# Patient Record
Sex: FEMALE | Race: WHITE | NOT HISPANIC OR LATINO | Employment: OTHER | URBAN - METROPOLITAN AREA
[De-identification: names, ages, dates, MRNs, and addresses within clinical notes are randomized per-mention and may not be internally consistent; named-entity substitution may affect disease eponyms.]

---

## 2023-03-24 LAB
ANION GAP IN SER/PLAS: 10 MMOL/L (ref 10–20)
CALCIUM (MG/DL) IN SER/PLAS: 9.2 MG/DL (ref 8.6–10.3)
CARBON DIOXIDE, TOTAL (MMOL/L) IN SER/PLAS: 26 MMOL/L (ref 21–32)
CHLORIDE (MMOL/L) IN SER/PLAS: 107 MMOL/L (ref 98–107)
CREATININE (MG/DL) IN SER/PLAS: 0.94 MG/DL (ref 0.5–1.05)
ERYTHROCYTE DISTRIBUTION WIDTH (RATIO) BY AUTOMATED COUNT: 13.6 % (ref 11.5–14.5)
ERYTHROCYTE MEAN CORPUSCULAR HEMOGLOBIN CONCENTRATION (G/DL) BY AUTOMATED: 33.2 G/DL (ref 32–36)
ERYTHROCYTE MEAN CORPUSCULAR VOLUME (FL) BY AUTOMATED COUNT: 88 FL (ref 80–100)
ERYTHROCYTES (10*6/UL) IN BLOOD BY AUTOMATED COUNT: 5.15 X10E12/L (ref 4–5.2)
GFR FEMALE: 66 ML/MIN/1.73M2
GLUCOSE (MG/DL) IN SER/PLAS: 99 MG/DL (ref 74–99)
HEMATOCRIT (%) IN BLOOD BY AUTOMATED COUNT: 45.2 % (ref 36–46)
HEMOGLOBIN (G/DL) IN BLOOD: 15 G/DL (ref 12–16)
LEUKOCYTES (10*3/UL) IN BLOOD BY AUTOMATED COUNT: 6.3 X10E9/L (ref 4.4–11.3)
PLATELETS (10*3/UL) IN BLOOD AUTOMATED COUNT: 277 X10E9/L (ref 150–450)
POTASSIUM (MMOL/L) IN SER/PLAS: 4.3 MMOL/L (ref 3.5–5.3)
SODIUM (MMOL/L) IN SER/PLAS: 139 MMOL/L (ref 136–145)
UREA NITROGEN (MG/DL) IN SER/PLAS: 18 MG/DL (ref 6–23)

## 2023-04-21 LAB
ANION GAP IN SER/PLAS: 11 MMOL/L (ref 10–20)
CALCIUM (MG/DL) IN SER/PLAS: 8.8 MG/DL (ref 8.6–10.3)
CARBON DIOXIDE, TOTAL (MMOL/L) IN SER/PLAS: 23 MMOL/L (ref 21–32)
CHLORIDE (MMOL/L) IN SER/PLAS: 110 MMOL/L (ref 98–107)
CREATININE (MG/DL) IN SER/PLAS: 0.75 MG/DL (ref 0.5–1.05)
ERYTHROCYTE DISTRIBUTION WIDTH (RATIO) BY AUTOMATED COUNT: 13.3 % (ref 11.5–14.5)
ERYTHROCYTE MEAN CORPUSCULAR HEMOGLOBIN CONCENTRATION (G/DL) BY AUTOMATED: 33.2 G/DL (ref 32–36)
ERYTHROCYTE MEAN CORPUSCULAR VOLUME (FL) BY AUTOMATED COUNT: 87 FL (ref 80–100)
ERYTHROCYTES (10*6/UL) IN BLOOD BY AUTOMATED COUNT: 4.71 X10E12/L (ref 4–5.2)
GFR FEMALE: 86 ML/MIN/1.73M2
GLUCOSE (MG/DL) IN SER/PLAS: 128 MG/DL (ref 74–99)
HEMATOCRIT (%) IN BLOOD BY AUTOMATED COUNT: 41 % (ref 36–46)
HEMOGLOBIN (G/DL) IN BLOOD: 13.6 G/DL (ref 12–16)
LEUKOCYTES (10*3/UL) IN BLOOD BY AUTOMATED COUNT: 6.1 X10E9/L (ref 4.4–11.3)
PLATELETS (10*3/UL) IN BLOOD AUTOMATED COUNT: 252 X10E9/L (ref 150–450)
POTASSIUM (MMOL/L) IN SER/PLAS: 3.7 MMOL/L (ref 3.5–5.3)
SODIUM (MMOL/L) IN SER/PLAS: 140 MMOL/L (ref 136–145)
UREA NITROGEN (MG/DL) IN SER/PLAS: 15 MG/DL (ref 6–23)

## 2023-04-22 LAB — SARS-COV-2 RESULT: NOT DETECTED

## 2023-04-25 ENCOUNTER — HOSPITAL ENCOUNTER (OUTPATIENT)
Dept: DATA CONVERSION | Facility: HOSPITAL | Age: 68
End: 2023-04-25
Attending: INTERNAL MEDICINE | Admitting: INTERNAL MEDICINE
Payer: COMMERCIAL

## 2023-04-25 DIAGNOSIS — R06.02 SHORTNESS OF BREATH: ICD-10-CM

## 2023-04-25 DIAGNOSIS — E78.5 HYPERLIPIDEMIA, UNSPECIFIED: ICD-10-CM

## 2023-04-25 DIAGNOSIS — I69.392 FACIAL WEAKNESS FOLLOWING CEREBRAL INFARCTION: ICD-10-CM

## 2023-04-25 DIAGNOSIS — R94.39 ABNORMAL RESULT OF OTHER CARDIOVASCULAR FUNCTION STUDY: ICD-10-CM

## 2023-04-25 DIAGNOSIS — Z91.040 LATEX ALLERGY STATUS: ICD-10-CM

## 2023-04-25 DIAGNOSIS — R07.9 CHEST PAIN, UNSPECIFIED: ICD-10-CM

## 2023-04-25 DIAGNOSIS — Z91.041 RADIOGRAPHIC DYE ALLERGY STATUS: ICD-10-CM

## 2023-04-25 DIAGNOSIS — Z87.891 PERSONAL HISTORY OF NICOTINE DEPENDENCE: ICD-10-CM

## 2023-04-25 DIAGNOSIS — M54.12 RADICULOPATHY, CERVICAL REGION: ICD-10-CM

## 2023-04-25 DIAGNOSIS — D69.6 THROMBOCYTOPENIA, UNSPECIFIED (CMS-HCC): ICD-10-CM

## 2023-04-25 DIAGNOSIS — E53.8 DEFICIENCY OF OTHER SPECIFIED B GROUP VITAMINS: ICD-10-CM

## 2023-04-25 DIAGNOSIS — D64.9 ANEMIA, UNSPECIFIED: ICD-10-CM

## 2023-04-25 DIAGNOSIS — I10 ESSENTIAL (PRIMARY) HYPERTENSION: ICD-10-CM

## 2023-04-25 DIAGNOSIS — Z90.49 ACQUIRED ABSENCE OF OTHER SPECIFIED PARTS OF DIGESTIVE TRACT: ICD-10-CM

## 2023-04-25 DIAGNOSIS — I20.89 OTHER FORMS OF ANGINA PECTORIS (CMS-HCC): ICD-10-CM

## 2023-04-25 DIAGNOSIS — Z82.49 FAMILY HISTORY OF ISCHEMIC HEART DISEASE AND OTHER DISEASES OF THE CIRCULATORY SYSTEM: ICD-10-CM

## 2023-04-25 DIAGNOSIS — E55.9 VITAMIN D DEFICIENCY, UNSPECIFIED: ICD-10-CM

## 2023-09-14 NOTE — H&P
History of Present Illness:   History Present Illness:  Reason for surgery: chest pain   HPI:    Verónica Silva is a 67 year old female that presents to the Cath Lab for a [facility] by Dr. Chung. She has a significant past medical history for hypertension, DL, CVA  (residual facial weakness ), anemia, abnormal gait, cervical radiculopathy, vertigo, thrombocytopenia, vitamin B12 and vitamin D deficiency.  Patient was last in cardiology office on March 24, 2023 and was evaluated by nurse practitioner Sharon Leone.   At that time the patient reported squeezing/pressure, pulsating pain in the middle of the chest-this occurs with activities such as cleaning and can last up to 1 hour.  She also describes using a stepladder would cause chest pain straightaway.  Her chest  pain is associated with dyspnea on exertion and palpitations.  This has been going on for a total of 1 year but it been worsening over the last last month.  Her chest pain is relieved with rest.  She ambulates with a cane.  Her TTE test on 3/7/2020 showed  LV systolic function is normal with an EF of 60%.  Normal pattern of LV diastolic filling.  No regional wall motion abnormalities. Her stress test showed no clinical electrocardiographic evidence of ischemia at maximal infusion. She has small sized partially  reversible perfusion defect in the distal anterior and apical walls suspicious for prior infarct and minimal gabby-infarct ischemia.  Calculated EF of 63% without segmental wall motion abnormalities seen.  She had a CT calcium score in 2019 that was 0.     Past Medical History: hypertension, DL, CVA (residual facial weakness ), anemia, abnormal gait, cervical radiculopathy, vertigo, thrombocytopenia, vitamin B12 and vitamin D deficiency.    Past Surgical History: Cholecystectomy, dental surgery, excision of facial bone, excision of turbinate, ileostomy,  myringotomy with ventilating tube insertion, no surgery, sex reassignment surgery 2008, history  of tonsillectomy with adenoidectomy    Family history: Mother with bleeding disorder, cardiac disorder, diabetes, cancer, mental disorder, heart problem.  Father with history of asthma, cardiac disorder, cancer.  Brother with autism, schizophrenia, drug disorder.    Social history: Denies alcohol consumption, former smoker, no alcohol use no caffeine use, no drug use, retired    Allergies:        Allergies:  ·  contrast (specific type unknown) : Anaphylaxis  ·  Morphine  Sulfate: Anaphylaxis  ·  Latex : Anaphylaxis  ·  Seafood : Other  ·  iodine : Unknown  ·  ibuprofen : Unknown       Intolerances:  ·  Animal/Fur Dander : Congestion    Home Medication Review:   Home Medications Reviewed: yes     Impression/Procedure:   ·  Impression and Planned Procedure: Impression: Chest pain abnormal stress test     Plan: Left heart catheterization with angioplasty/stenting if needed       ERAS (Enhanced Recovery After Surgery):  ·  ERAS Patient: no     Review of Systems:   Review of Systems:  Constitutional: NEGATIVE: Fever, Chills, Anorexia,  Weight Loss, Malaise     Eyes: NEGATIVE: Blurry Vision, Drainage, Diploplia,  Redness, Vision Loss/ Change     ENMT: NEGATIVE: Nasal Discharge, Nasal Congestion,  Ear Pain, Mouth Pain, Throat Pain     Respiratory: POSITIVE: Shortness of Breath; NEGATIVE:  Dry Cough, Productive Cough, Hemoptysis, Wheezing     Cardiac: POSITIVE: Chest Pain, Dyspnea on Exertion ; NEGATIVE: Orthopnea, Palpitations, Syncope     Gastrointestinal: NEGATIVE: Nausea, Vomiting, Diarrhea,  Constipation, Abdominal Pain     Genitourinary: NEGATIVE: Discharge, Dysuria, Flank  Pain, Frequency, Hematuria     Musculoskeletal: NEGATIVE: Decreased ROM, Pain,  Swelling, Stiffness, Weakness     Neurological: NEGATIVE: Dizziness, Confusion, Headache,  Seizures, Syncope     Psychiatric: NEGATIVE: Mood Changes, Anxiety, Hallucinations,  Sleep Changes, Suicidal Ideas     Skin: NEGATIVE: Mass, Pain, Pruritus, Rash, Ulcer          Physical Exam by System:    Constitutional: Well developed, awake/alert/oriented  x3, no distress, alert and cooperative   Eyes: EOMI, clear sclera   ENMT: mucous membranes moist, no apparent injury,  no lesions seen   Head/Neck: No JVD, trachea midline   Respiratory/Thorax: Patent airways, CTAB, normal  breath sounds with good chest expansion   Cardiovascular: Regular, rate and rhythm, no murmurs,  2+ equal pulses of the extremities, normal S 1and S 2   Gastrointestinal: Nondistended, soft, non-tender   Musculoskeletal: ROM intact, no joint swelling, normal  strength   Extremities: normal extremities, no cyanosis, no  edema   Neurological: alert and oriented x3, normal strength   Psychological: Appropriate mood and behavior   Skin: Warm and dry, no lesions, no rashes     Airway/Sedation Assessment:  ·  Oropharyngeal Classification Class II   ·  ASA PS Classification ASA III   ·  Sedation Plan moderate sedation     Consent:   COVID-19 Consent:  ·  COVID-19 Risk Consent Surgeon has reviewed key risks related to the risk of elsie COVID-19 and if they contract COVID-19 what the risks are.       Electronic Signatures:  Leeann Grimm (APRN-CNP)  (Signed 25-Apr-2023 12:06)   Authored: History of Present Illness, Allergies, Home  Medication Review, Impression/Procedure, ERAS, Review of Systems, Physical Exam, Consent, Note Completion      Last Updated: 25-Apr-2023 12:06 by Leeann Grimm (APRN-CNP)

## 2023-10-06 DIAGNOSIS — H90.3 SENSORINEURAL HEARING LOSS, BILATERAL: ICD-10-CM

## 2023-10-09 RX ORDER — FLUTICASONE PROPIONATE 50 MCG
2 SPRAY, SUSPENSION (ML) NASAL DAILY
Qty: 16 G | Refills: 6 | Status: SHIPPED | OUTPATIENT
Start: 2023-10-09 | End: 2024-05-07

## 2023-10-09 NOTE — TELEPHONE ENCOUNTER
Requested Prescriptions     Pending Prescriptions Disp Refills    fluticasone (Flonase) 50 mcg/actuation nasal spray [Pharmacy Med Name: fluticasone propionate 50 mcg/actuation nasal spray,suspension] 16 g 6     Sig: SHAKE LIQUID AND USE 2 SPRAYS IN EACH NOSTRIL EVERY DAY      Xenia Plasencia MA

## 2023-11-20 ENCOUNTER — TELEPHONE (OUTPATIENT)
Dept: PRIMARY CARE | Facility: CLINIC | Age: 68
End: 2023-11-20

## 2023-11-20 ENCOUNTER — OFFICE VISIT (OUTPATIENT)
Dept: PRIMARY CARE | Facility: CLINIC | Age: 68
End: 2023-11-20
Payer: COMMERCIAL

## 2023-11-20 VITALS
WEIGHT: 210 LBS | TEMPERATURE: 98.1 F | HEIGHT: 65 IN | OXYGEN SATURATION: 98 % | SYSTOLIC BLOOD PRESSURE: 126 MMHG | HEART RATE: 68 BPM | BODY MASS INDEX: 34.99 KG/M2 | DIASTOLIC BLOOD PRESSURE: 72 MMHG

## 2023-11-20 DIAGNOSIS — I10 ESSENTIAL HYPERTENSION, BENIGN: Primary | ICD-10-CM

## 2023-11-20 DIAGNOSIS — M51.37 DEGENERATION OF LUMBAR OR LUMBOSACRAL INTERVERTEBRAL DISC: ICD-10-CM

## 2023-11-20 PROCEDURE — 1159F MED LIST DOCD IN RCRD: CPT | Performed by: INTERNAL MEDICINE

## 2023-11-20 PROCEDURE — 3074F SYST BP LT 130 MM HG: CPT | Performed by: INTERNAL MEDICINE

## 2023-11-20 PROCEDURE — 3078F DIAST BP <80 MM HG: CPT | Performed by: INTERNAL MEDICINE

## 2023-11-20 PROCEDURE — 99215 OFFICE O/P EST HI 40 MIN: CPT | Performed by: INTERNAL MEDICINE

## 2023-11-20 RX ORDER — ATORVASTATIN CALCIUM 80 MG/1
80 TABLET, FILM COATED ORAL NIGHTLY
COMMUNITY
End: 2023-12-12

## 2023-11-20 RX ORDER — AMLODIPINE BESYLATE 5 MG/1
5 TABLET ORAL
COMMUNITY
Start: 2019-06-27 | End: 2023-12-12

## 2023-11-20 RX ORDER — ALBUTEROL SULFATE 90 UG/1
AEROSOL, METERED RESPIRATORY (INHALATION)
COMMUNITY
Start: 2015-06-30

## 2023-11-20 RX ORDER — ATORVASTATIN CALCIUM 40 MG/1
40 TABLET, FILM COATED ORAL
COMMUNITY
Start: 2021-03-24 | End: 2024-03-19 | Stop reason: SDUPTHER

## 2023-11-20 RX ORDER — ASPIRIN 325 MG
50000 TABLET, DELAYED RELEASE (ENTERIC COATED) ORAL
COMMUNITY
Start: 2023-10-07 | End: 2024-03-11 | Stop reason: WASHOUT

## 2023-11-20 RX ORDER — ASPIRIN 81 MG/1
TABLET ORAL
COMMUNITY
Start: 2021-12-20 | End: 2024-03-11 | Stop reason: WASHOUT

## 2023-11-20 RX ORDER — CYCLOBENZAPRINE HCL 10 MG
TABLET ORAL
COMMUNITY

## 2023-11-20 RX ORDER — AZELASTINE HYDROCHLORIDE, FLUTICASONE PROPIONATE 137; 50 UG/1; UG/1
SPRAY, METERED NASAL
COMMUNITY
Start: 2021-05-12 | End: 2024-03-11 | Stop reason: WASHOUT

## 2023-11-20 RX ORDER — BUDESONIDE AND FORMOTEROL FUMARATE DIHYDRATE 160; 4.5 UG/1; UG/1
2 AEROSOL RESPIRATORY (INHALATION)
COMMUNITY
Start: 2018-03-12

## 2023-11-20 RX ORDER — BUPRENORPHINE HCL 600 MCG
FILM, MEDICATED (EA) BUCCAL
COMMUNITY
Start: 2019-04-19 | End: 2024-03-11 | Stop reason: WASHOUT

## 2023-11-20 RX ORDER — AZELASTINE 1 MG/ML
1 SPRAY, METERED NASAL 2 TIMES DAILY
COMMUNITY
Start: 2022-08-23

## 2023-11-20 ASSESSMENT — ENCOUNTER SYMPTOMS
WEAKNESS: 1
CONSTIPATION: 0
DIZZINESS: 0
ACTIVITY CHANGE: 0
DYSURIA: 0
ABDOMINAL PAIN: 0
ENDOCRINE NEGATIVE: 1
SHORTNESS OF BREATH: 0
EYE REDNESS: 0
COUGH: 0
BACK PAIN: 1
FATIGUE: 0
BLOOD IN STOOL: 0
HEADACHES: 0
ARTHRALGIAS: 1
WHEEZING: 0
PALPITATIONS: 0
ALLERGIC/IMMUNOLOGIC NEGATIVE: 1
FREQUENCY: 0
ADENOPATHY: 0
JOINT SWELLING: 0
FEVER: 0
NUMBNESS: 0
AGITATION: 0

## 2023-11-20 NOTE — PROGRESS NOTES
Subjective   Patient ID: Verónica Silva is a 67 y.o. female who presents for No chief complaint on file..    HPI: She lives  in [address] by self and her brother lives one floor below.  She has chronic pain , multiple medical issues and has ileostomy.   She uses cane , and able to do ADLs. No recent fall , injury  She has bilateral LE tingling , numbness and feeling of burning over feet soles, more so at night.  MRI of L S spine reviewed with her , she has multilevel spondylosis and varying degree  of central canal stenosis / neural foraminal narrowing.  Surgery was postponed for L S Spine, per Dr Izaguirre and she had heart cath per dr Chung.  Leg swelling has resolved now. She had lumbar spine surgery in October and post op rehab in a NH  She has returned to home , has [facility] health care nurse visiting for OT, PT.  She goes to Pain management and gets Belbuca film.She has been taking oxycodone po for pain.   The pain has significantly improved and her mobility has improved also, she can stand and walk for short distance.    She has seen Dr Ronald Camara, and had UGI , SBFT study, advised to have low fiber diet.          Review of Systems   Constitutional:  Negative for activity change, fatigue and fever.        Up with a walker , assistance   HENT:  Negative for congestion.    Eyes:  Negative for redness and visual disturbance.   Respiratory:  Negative for cough, shortness of breath and wheezing.    Cardiovascular:  Negative for chest pain, palpitations and leg swelling.   Gastrointestinal:  Negative for abdominal pain, blood in stool and constipation.   Endocrine: Negative.    Genitourinary:  Negative for dysuria, frequency and urgency.        +OAB   Musculoskeletal:  Positive for arthralgias, back pain and gait problem. Negative for joint swelling.   Skin:  Negative for rash.   Allergic/Immunologic: Negative.    Neurological:  Positive for weakness. Negative for dizziness, numbness and headaches.   Hematological:   "Negative for adenopathy.   Psychiatric/Behavioral:  Negative for agitation and behavioral problems.        Objective   /72 (BP Location: Left arm, Patient Position: Sitting, BP Cuff Size: Adult)   Pulse 68   Temp 36.7 °C (98.1 °F)   Ht 1.651 m (5' 5\")   Wt 95.3 kg (210 lb)   SpO2 98%   BMI 34.95 kg/m²     Physical Exam  Constitutional:       Appearance: Normal appearance.   HENT:      Nose: No congestion.      Mouth/Throat:      Mouth: Mucous membranes are moist.      Pharynx: Oropharynx is clear.   Eyes:      Conjunctiva/sclera: Conjunctivae normal.   Cardiovascular:      Rate and Rhythm: Normal rate and regular rhythm.      Pulses: Normal pulses.      Heart sounds: Normal heart sounds. No murmur heard.  Pulmonary:      Effort: Pulmonary effort is normal.      Breath sounds: Normal breath sounds. No wheezing or rales.   Abdominal:      General: Abdomen is flat. Bowel sounds are normal.      Palpations: Abdomen is soft.      Hernia: No hernia is present.      Comments: Ileostomy site is ok   Musculoskeletal:         General: No swelling or tenderness. Normal range of motion.      Cervical back: Normal range of motion and neck supple.      Right lower leg: No edema.      Left lower leg: No edema.   Skin:     General: Skin is warm and dry.      Capillary Refill: Capillary refill takes less than 2 seconds.      Findings: No rash.      Comments: Lumbar surgical scar well healed   Neurological:      General: No focal deficit present.      Mental Status: She is alert and oriented to person, place, and time.   Psychiatric:         Mood and Affect: Mood normal.         Behavior: Behavior normal.         Assessment/Plan       Lumbar spine DJD:  S/p surgery per Dr Izaguirre   Postop rehab finished at NH  Continue OT and PT , pain control  She goes to pain management   Up with assist, walker    OAB:  She has appt with Dr Acevedo at [place]      Hypertension:  Amlodipine  Carvediol   BILL diet  She follows with Dr" Mayug    Hyperlipidemia:  Statin    H/o TIA :  ASA and statin    Ileostomy status:  GI / surgery followup  No leak    She saw VARSHA Cole  at [facility] , had motility studies ( [facility] )  EGD and Flex sig were done   She had UGI and SBFT also per Dr Camara  Low residue diet  Bentyl prn for cramps , and zofran prn for nausea             Vision care  She would wait to get flushot, Covid booster  Mammogram yearly

## 2023-11-20 NOTE — TELEPHONE ENCOUNTER
NICKY [phone] OCCUPATIONAL THERAPIST WITH [employer]  IS CALLING TO GET VERBAL ORDERS FOR PATIENT TO GET  OT ONE TIME A WEEK FOR TWO WEEKS AND TWO TIMES A WEEK  FOR THREE WEEKS.  I GAVE THE VERBAL ORDER

## 2023-11-29 ENCOUNTER — OFFICE VISIT (OUTPATIENT)
Dept: UROLOGY | Facility: CLINIC | Age: 68
End: 2023-11-29
Payer: COMMERCIAL

## 2023-11-29 ENCOUNTER — TELEPHONE (OUTPATIENT)
Dept: PRIMARY CARE | Facility: CLINIC | Age: 68
End: 2023-11-29

## 2023-11-29 LAB
POC BILIRUBIN, URINE: ABNORMAL
POC BLOOD, URINE: NEGATIVE
POC GLUCOSE, URINE: NEGATIVE MG/DL
POC KETONES, URINE: ABNORMAL MG/DL
POC LEUKOCYTES, URINE: NEGATIVE
POC NITRITE,URINE: NEGATIVE
POC PH, URINE: 6 PH
POC PROTEIN, URINE: ABNORMAL MG/DL
POC SPECIFIC GRAVITY, URINE: 1.02
POC UROBILINOGEN, URINE: 0.2 EU/DL

## 2023-11-29 PROCEDURE — 99214 OFFICE O/P EST MOD 30 MIN: CPT | Performed by: STUDENT IN AN ORGANIZED HEALTH CARE EDUCATION/TRAINING PROGRAM

## 2023-11-29 PROCEDURE — 81003 URINALYSIS AUTO W/O SCOPE: CPT | Performed by: STUDENT IN AN ORGANIZED HEALTH CARE EDUCATION/TRAINING PROGRAM

## 2023-11-29 PROCEDURE — 1159F MED LIST DOCD IN RCRD: CPT | Performed by: STUDENT IN AN ORGANIZED HEALTH CARE EDUCATION/TRAINING PROGRAM

## 2023-11-29 RX ORDER — OXYCODONE HYDROCHLORIDE 10 MG/1
TABLET ORAL EVERY 6 HOURS
COMMUNITY
End: 2024-03-11 | Stop reason: WASHOUT

## 2023-11-29 RX ORDER — TAMSULOSIN HYDROCHLORIDE 0.4 MG/1
0.4 CAPSULE ORAL DAILY
Qty: 30 CAPSULE | Refills: 2 | Status: SHIPPED | OUTPATIENT
Start: 2023-11-29 | End: 2024-02-02

## 2023-11-29 NOTE — PROGRESS NOTES
[facility] Urology - Dr. Nico Navas    Established Patient  Visit    PCP: Jazzmine Catalan MD    Chief Complaint/Reason for visit: FU LUTS    HPI:   LUTS  Hx of ?urethral stricture  C/o weak stream  Was on tamsulosin previously   Had post operative urinary retention after L-spine surgery last month  Some blood in urine at that time   Normal amount of gas and stool  Still using oxycodone     Last visit:    1. BPH with LUTS  Mild  Weak stream  H/o stricture, last cysto >5 years ago  Overall minimal bother  Nocturia q3 hours, stable  PSA historically very low     Past Medical History:   Diagnosis Date    Acute pharyngitis, unspecified 01/20/2017    Sore throat    Acute pharyngitis, unspecified 08/04/2020    Sore throat    Acute respiratory distress     Respiratory difficulty    Acute sinusitis, unspecified 04/11/2017    Acute sinusitis with symptoms > 10 days    Anosmia 08/04/2020    Loss of smell    Candidiasis of skin and nail 07/31/2018    Moniliasis, cutaneous    Cellulitis, unspecified 01/21/2021    Cellulitis    Colostomy status (CMS/[facility]) 01/17/2018    Colostomy in place    Cough, unspecified 07/22/2020    Cough    Dysuria 07/21/2022    Dysuria    Encounter for immunization 12/09/2019    Flu vaccine need    Endocrine disorder, unspecified 04/22/2019    Hormone imbalance    Gastro-esophageal reflux disease with esophagitis, without bleeding     Gastroesophageal reflux disease with esophagitis    Headache, unspecified 08/04/2020    Headache    Hyperlipidemia, unspecified 04/29/2020    Hyperlipidemia    Localized swelling, mass and lump, right upper limb 11/20/2017    Localized swelling on right hand    Lower abdominal pain, unspecified 01/17/2018    Lower abdominal pain    Nausea 08/04/2020    Nausea in adult    Nausea with vomiting, unspecified 07/22/2020    Nausea and/or vomiting    Other allergy status, other than to drugs and biological substances     Environmental allergies    Other conditions influencing  health status 11/20/2017    History of cough    Other conditions influencing health status 10/04/2017    History of cough    Other difficulties with micturition 06/26/2017    Voiding difficulty    Other disorders of nervous system     Neurological disorder    Other fatigue 04/29/2020    Fatigue, unspecified type    Other fecal abnormalities 01/17/2018    Dark stools    Other specified soft tissue disorders 11/20/2017    Foot swelling    Other specified soft tissue disorders 01/21/2021    Left leg swelling    Pain in right knee 04/04/2016    Acute pain of right knee    Paresthesia of skin 02/24/2016    Pins and needles sensation    Paresthesia of skin 06/25/2018    Paresthesia of skin    Personal history of other (healed) physical injury and trauma 01/20/2017    History of sprain of ankle    Personal history of other diseases of the circulatory system     History of hypertension    Personal history of other diseases of the musculoskeletal system and connective tissue     History of low back pain    Personal history of other diseases of the respiratory system 01/19/2016    History of acute bronchitis    Personal history of other diseases of the respiratory system 10/04/2017    History of sore throat    Personal history of other diseases of the respiratory system 11/20/2017    History of acute sinusitis    Personal history of other specified conditions 05/03/2016    History of vertigo    Personal history of other specified conditions 08/04/2020    History of fatigue    Poor urinary stream 05/07/2019    Poor urinary stream    Rash and other nonspecific skin eruption 09/21/2017    Rash    Shortness of breath 02/24/2016    SOB (shortness of breath)    Thrombocytopenia, unspecified (CMS/HCC) 01/18/2018    Thrombocytopenia    Transient cerebral ischemic attack, unspecified 04/22/2019    TIA (transient ischemic attack)    Unspecified abnormalities of gait and mobility 04/22/2019    Abnormal gait    Unspecified symptoms and  signs involving the genitourinary system 03/19/2020    Urinary symptom or sign    Vitamin B12 deficiency anemia due to intrinsic factor deficiency 09/21/2017    Anemia, pernicious    Vitamin D deficiency, unspecified 01/11/2021    Vitamin D deficiency    Vomiting, unspecified 01/17/2018    Vomiting    Zoster without complications 10/16/2015    Shingles     Past Surgical History:   Procedure Laterality Date    CHOLECYSTECTOMY  03/18/2014    Cholecystectomy    ILEOSTOMY  03/18/2014    Ileostomy    MYRINGOTOMY W/ TUBES  03/18/2014    Myringotomy - With Ventilating Tube Insertion    OTHER SURGICAL HISTORY  06/28/2019    Nose surgery    OTHER SURGICAL HISTORY  06/28/2019    Ear pressure equalization tube insertion    OTHER SURGICAL HISTORY  03/18/2014    Sex Reassignment Surgery    OTHER SURGICAL HISTORY  03/18/2014    Excision Of Turbinate    OTHER SURGICAL HISTORY  03/18/2014    Dental Surgery    OTHER SURGICAL HISTORY  03/18/2014    Excision Of Facial Bone    TONSILLECTOMY  03/18/2014    Tonsillectomy With Adenoidectomy     Social History     Socioeconomic History    Marital status: Single     Spouse name: Not on file    Number of children: Not on file    Years of education: Not on file    Highest education level: Not on file   Occupational History    Not on file   Tobacco Use    Smoking status: Not on file    Smokeless tobacco: Not on file   Substance and Sexual Activity    Alcohol use: Not on file    Drug use: Not on file    Sexual activity: Not on file   Other Topics Concern    Not on file   Social History Narrative    Not on file     Social Determinants of Health     Financial Resource Strain: Not on file   Food Insecurity: Not on file   Transportation Needs: Not on file   Physical Activity: Not on file   Stress: Not on file   Social Connections: Not on file   Intimate Partner Violence: Not on file   Housing Stability: Not on file     Current Outpatient Medications   Medication Instructions    albuterol 90  mcg/actuation inhaler INHALE TWO PUFFS BY MOUTH EVERY 4 HOURS AS NEEDED FOR WHEEZING, SHORTNESS OF BREATH, OR cough (USE WITH spacer)    amLODIPine (NORVASC) 5 mg, oral    aspirin 81 mg EC tablet oral    atorvastatin (LIPITOR) 80 mg, oral, Nightly    azelastine (Astelin) 137 mcg (0.1 %) nasal spray 1 spray, nasal, 2 times daily    azelastine-fluticasone (Dymista) 137-50 mcg/spray nasal spray nasal    budesonide-formoteroL (Symbicort) 160-4.5 mcg/actuation inhaler 2 puffs, inhalation    buprenorphine (Belbuca) 600 mcg buccal film APPLY ONE FILM TO THE GUM EVERY TWELVE HOURS 4/19/19    cholecalciferol (VITAMIN D-3) 50,000 Units, oral, Weekly    cyclobenzaprine (Flexeril) 10 mg tablet TAKE 1/2 TO 1 TABLET BY MOUTH TWICE DAILY AS NEEDED FOR 30 DAYS    fluticasone (Flonase) 50 mcg/actuation nasal spray 2 sprays, Daily, Shake liquid prior to use.    Lipitor 40 mg, oral     No Known Allergies       Physical Exam:  General: Alert, cooperative, no acute distress  Eyes: Sclera clear  Cardiac: Extremities are warm and well perfused  Lungs: Breathing non-labored. Speaking in clear and complete sentences.  MSK: Ambulatory with walker   Neuro: Alert and oriented to person, place, and time  Psych: Normal mood and affect  Skin: No obvious lesions or rashes    Assessment and Plan:    1. Benign prostatic hyperplasia with urinary retention  Urinalysis today in the office demonstrates no overt evidence of urinary tract infection.  Resume tamsulosin  R/B/A discussed  PVR low  FUV 3 months PVR  - POCT UA Automated manually resulted

## 2023-11-29 NOTE — TELEPHONE ENCOUNTER
MARLA NURSE CALLING FROM [facility] ASKING FOR VERBAL ORDER FOR RECTAL IRRIGATION, PT JUST HAD BACK SURGERY AND HAVING HARD TIME.   [phone] TO CALL BACK WITH VERBAL

## 2023-11-29 NOTE — TELEPHONE ENCOUNTER
I think the [facility] nurse should check with surgeon for post operative issues, I have not seen her after recent surgery

## 2023-12-12 DIAGNOSIS — I10 HYPERTENSION, UNSPECIFIED TYPE: Primary | ICD-10-CM

## 2023-12-12 DIAGNOSIS — E78.2 MIXED HYPERLIPIDEMIA: ICD-10-CM

## 2023-12-12 RX ORDER — ATORVASTATIN CALCIUM 80 MG/1
80 TABLET, FILM COATED ORAL NIGHTLY
Qty: 90 TABLET | Refills: 0 | Status: SHIPPED | OUTPATIENT
Start: 2023-12-12 | End: 2024-03-12

## 2023-12-12 RX ORDER — AMLODIPINE BESYLATE 5 MG/1
5 TABLET ORAL DAILY
Qty: 90 TABLET | Refills: 0 | Status: SHIPPED | OUTPATIENT
Start: 2023-12-12 | End: 2024-03-12

## 2024-01-05 ENCOUNTER — TELEPHONE (OUTPATIENT)
Dept: PRIMARY CARE | Facility: CLINIC | Age: 69
End: 2024-01-05
Payer: COMMERCIAL

## 2024-01-05 NOTE — TELEPHONE ENCOUNTER
Esther from [facility] called. Asking for a verbal order to continue physical therapy- given.      Also,  Pt had covid 2-3 weeks ago. So extending physical therapy for that reason.

## 2024-01-30 PROBLEM — R39.9 URINARY SYMPTOM OR SIGN: Status: ACTIVE | Noted: 2024-01-30

## 2024-01-30 PROBLEM — R49.0 HOARSENESS OF VOICE: Status: ACTIVE | Noted: 2024-01-30

## 2024-01-30 PROBLEM — I34.1 NONRHEUMATIC MITRAL (VALVE) PROLAPSE: Status: ACTIVE | Noted: 2023-11-01

## 2024-01-30 PROBLEM — R53.81 MALAISE AND FATIGUE: Status: ACTIVE | Noted: 2024-01-30

## 2024-01-30 PROBLEM — G25.81 RESTLESS LEGS SYNDROME: Status: ACTIVE | Noted: 2023-11-01

## 2024-01-30 PROBLEM — M48.07 LUMBOSACRAL SPINAL STENOSIS: Status: ACTIVE | Noted: 2023-11-01

## 2024-01-30 PROBLEM — E34.9 HORMONE IMBALANCE: Status: ACTIVE | Noted: 2024-01-30

## 2024-01-30 PROBLEM — H10.30 ACUTE CONJUNCTIVITIS: Status: ACTIVE | Noted: 2024-01-30

## 2024-01-30 PROBLEM — N40.0 BENIGN PROSTATIC HYPERPLASIA WITHOUT LOWER URINARY TRACT SYMPTOMS: Status: ACTIVE | Noted: 2023-11-01

## 2024-01-30 PROBLEM — R26.9 ABNORMAL GAIT: Status: ACTIVE | Noted: 2024-01-30

## 2024-01-30 PROBLEM — R06.2 WHEEZING: Status: ACTIVE | Noted: 2024-01-30

## 2024-01-30 PROBLEM — R51.9 HEADACHE: Status: ACTIVE | Noted: 2024-01-30

## 2024-01-30 PROBLEM — B37.2 MONILIASIS, CUTANEOUS: Status: ACTIVE | Noted: 2024-01-30

## 2024-01-30 PROBLEM — R39.12 POOR URINARY STREAM: Status: ACTIVE | Noted: 2024-01-30

## 2024-01-30 PROBLEM — B02.9 SHINGLES: Status: ACTIVE | Noted: 2024-01-30

## 2024-01-30 PROBLEM — R06.02 SOB (SHORTNESS OF BREATH): Status: ACTIVE | Noted: 2024-01-30

## 2024-01-30 PROBLEM — J32.4 CHRONIC PANSINUSITIS: Status: ACTIVE | Noted: 2024-01-30

## 2024-01-30 PROBLEM — R39.198 VOIDING DIFFICULTY: Status: ACTIVE | Noted: 2024-01-30

## 2024-01-30 PROBLEM — M79.89 LEFT LEG SWELLING: Status: ACTIVE | Noted: 2024-01-30

## 2024-01-30 PROBLEM — R53.83 FATIGUE: Status: ACTIVE | Noted: 2024-01-30

## 2024-01-30 PROBLEM — R20.2 PINS AND NEEDLES SENSATION: Status: ACTIVE | Noted: 2024-01-30

## 2024-01-30 PROBLEM — J30.1 ALLERGIC RHINITIS DUE TO POLLEN: Status: ACTIVE | Noted: 2024-01-30

## 2024-01-30 PROBLEM — Z90.49 STATUS POST COLECTOMY: Status: ACTIVE | Noted: 2022-01-11

## 2024-01-30 PROBLEM — R35.0 URINARY FREQUENCY: Status: ACTIVE | Noted: 2024-01-30

## 2024-01-30 PROBLEM — M54.50 LOW BACK PAIN: Status: ACTIVE | Noted: 2024-01-30

## 2024-01-30 PROBLEM — D47.2 IGG LAMBDA MONOCLONAL GAMMOPATHY: Status: ACTIVE | Noted: 2024-01-30

## 2024-01-30 PROBLEM — G62.9 PERIPHERAL NEUROPATHY: Status: ACTIVE | Noted: 2024-01-30

## 2024-01-30 PROBLEM — B37.0 THRUSH: Status: ACTIVE | Noted: 2024-01-30

## 2024-01-30 PROBLEM — J34.0 CELLULITIS OF NASAL TIP: Status: ACTIVE | Noted: 2024-01-30

## 2024-01-30 PROBLEM — R53.83 MALAISE AND FATIGUE: Status: ACTIVE | Noted: 2024-01-30

## 2024-01-30 PROBLEM — B36.9 SUPERFICIAL FUNGUS INFECTION OF SKIN: Status: ACTIVE | Noted: 2024-01-30

## 2024-01-30 PROBLEM — H90.3 SENSORINEURAL HEARING LOSS (SNHL) OF BOTH EARS: Status: ACTIVE | Noted: 2024-01-30

## 2024-01-30 PROBLEM — R30.0 DYSURIA: Status: ACTIVE | Noted: 2024-01-30

## 2024-01-30 PROBLEM — L29.9 PRURITUS: Status: ACTIVE | Noted: 2024-01-30

## 2024-01-30 PROBLEM — L03.116 CELLULITIS OF LEFT LOWER EXTREMITY: Status: ACTIVE | Noted: 2020-11-28

## 2024-01-30 PROBLEM — K31.7 GASTRIC POLYP: Status: ACTIVE | Noted: 2022-01-11

## 2024-01-30 PROBLEM — R19.5 DARK STOOLS: Status: ACTIVE | Noted: 2024-01-30

## 2024-01-30 PROBLEM — R31.9 HEMATURIA: Status: ACTIVE | Noted: 2024-01-30

## 2024-01-30 PROBLEM — N39.0 UTI (URINARY TRACT INFECTION): Status: ACTIVE | Noted: 2024-01-30

## 2024-01-30 PROBLEM — R04.0 EPISTAXIS, RECURRENT: Status: ACTIVE | Noted: 2024-01-30

## 2024-01-30 PROBLEM — H81.13 BENIGN PAROXYSMAL POSITIONAL VERTIGO DUE TO BILATERAL VESTIBULAR DISORDER: Status: ACTIVE | Noted: 2024-01-30

## 2024-01-30 PROBLEM — M47.817 LUMBOSACRAL SPONDYLOSIS: Status: ACTIVE | Noted: 2024-01-30

## 2024-01-30 PROBLEM — R07.89 ATYPICAL CHEST PAIN: Status: ACTIVE | Noted: 2024-01-30

## 2024-01-30 PROBLEM — H61.23 EXCESSIVE EAR WAX, BILATERAL: Status: ACTIVE | Noted: 2024-01-30

## 2024-01-30 PROBLEM — R22.31 LOCALIZED SWELLING ON RIGHT HAND: Status: ACTIVE | Noted: 2024-01-30

## 2024-01-30 PROBLEM — K58.8 OTHER IRRITABLE BOWEL SYNDROME: Status: ACTIVE | Noted: 2023-11-02

## 2024-01-30 PROBLEM — R13.10 DYSPHAGIA: Status: ACTIVE | Noted: 2022-01-11

## 2024-01-30 PROBLEM — R05.9 COUGH: Status: ACTIVE | Noted: 2024-01-30

## 2024-01-30 PROBLEM — M54.2 NECK PAIN: Status: ACTIVE | Noted: 2024-01-30

## 2024-01-30 PROBLEM — D69.6 THROMBOCYTOPENIA (CMS-HCC): Status: ACTIVE | Noted: 2024-01-30

## 2024-01-30 PROBLEM — J34.89 NASAL CRUSTING: Status: ACTIVE | Noted: 2024-01-30

## 2024-01-30 PROBLEM — H93.12 LEFT-SIDED TINNITUS: Status: ACTIVE | Noted: 2024-01-30

## 2024-01-30 PROBLEM — H90.A22 SENSORINEURAL HEARING LOSS (SNHL) OF LEFT EAR WITH RESTRICTED HEARING OF RIGHT EAR: Status: ACTIVE | Noted: 2024-01-30

## 2024-01-30 PROBLEM — J02.9 SORE THROAT: Status: ACTIVE | Noted: 2024-01-30

## 2024-01-30 PROBLEM — R09.81 NASAL CONGESTION WITH RHINORRHEA: Status: ACTIVE | Noted: 2024-01-30

## 2024-01-30 PROBLEM — R21 RASH: Status: ACTIVE | Noted: 2024-01-30

## 2024-01-30 PROBLEM — R10.30 LOWER ABDOMINAL PAIN: Status: ACTIVE | Noted: 2022-01-11

## 2024-01-30 PROBLEM — Z93.3 COLOSTOMY IN PLACE (MULTI): Status: ACTIVE | Noted: 2024-01-30

## 2024-01-30 PROBLEM — R25.1 TREMOR: Status: ACTIVE | Noted: 2024-01-30

## 2024-01-30 PROBLEM — H69.93 DYSFUNCTION OF BOTH EUSTACHIAN TUBES: Status: ACTIVE | Noted: 2024-01-30

## 2024-01-30 PROBLEM — D47.2 IGG MONOCLONAL PROTEIN DISORDER: Status: ACTIVE | Noted: 2024-01-30

## 2024-01-30 PROBLEM — M54.16 LUMBAR NERVE ROOT IMPINGEMENT: Status: ACTIVE | Noted: 2024-01-30

## 2024-01-30 PROBLEM — K21.9 GASTRO-ESOPHAGEAL REFLUX DISEASE WITHOUT ESOPHAGITIS: Status: ACTIVE | Noted: 2023-11-01

## 2024-01-30 PROBLEM — H65.493 CHRONIC OTITIS MEDIA WITH EFFUSION, BILATERAL: Status: ACTIVE | Noted: 2024-01-30

## 2024-01-30 PROBLEM — M25.50 PAIN IN JOINT: Status: ACTIVE | Noted: 2024-01-30

## 2024-01-30 PROBLEM — G45.9 TIA (TRANSIENT ISCHEMIC ATTACK): Status: ACTIVE | Noted: 2024-01-30

## 2024-01-30 PROBLEM — M54.12 CERVICAL RADICULOPATHY: Status: ACTIVE | Noted: 2024-01-30

## 2024-01-30 PROBLEM — R11.10 VOMITING: Status: ACTIVE | Noted: 2024-01-30

## 2024-01-30 PROBLEM — K90.829: Status: ACTIVE | Noted: 2023-11-01

## 2024-01-30 PROBLEM — L03.90 CELLULITIS: Status: ACTIVE | Noted: 2024-01-30

## 2024-01-30 PROBLEM — M62.81 MUSCLE WEAKNESS: Status: ACTIVE | Noted: 2024-01-30

## 2024-01-30 PROBLEM — K13.0 ANGULAR CHEILITIS: Status: ACTIVE | Noted: 2024-01-30

## 2024-01-30 PROBLEM — K31.84 GASTROPARESIS: Status: ACTIVE | Noted: 2023-11-01

## 2024-01-30 PROBLEM — R43.0 LOSS OF SMELL: Status: ACTIVE | Noted: 2024-01-30

## 2024-01-30 PROBLEM — H66.90 OTITIS MEDIA: Status: ACTIVE | Noted: 2024-01-30

## 2024-01-30 PROBLEM — E55.9 VITAMIN D DEFICIENCY: Status: ACTIVE | Noted: 2023-11-01

## 2024-01-30 PROBLEM — J06.9 ACUTE URI: Status: ACTIVE | Noted: 2024-01-30

## 2024-01-30 PROBLEM — E53.8 VITAMIN B12 DEFICIENCY: Status: ACTIVE | Noted: 2024-01-30

## 2024-01-30 PROBLEM — N35.919 URETHRAL STRICTURE: Status: ACTIVE | Noted: 2024-01-30

## 2024-01-30 PROBLEM — R20.1 HYPOESTHESIA: Status: ACTIVE | Noted: 2024-01-30
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
## 2024-01-30 PROBLEM — G56.03 CARPAL TUNNEL SYNDROME ON BOTH SIDES: Status: ACTIVE | Noted: 2024-01-30

## 2024-01-30 PROBLEM — R39.11 HESITANCY: Status: ACTIVE | Noted: 2024-01-30

## 2024-01-30 PROBLEM — Z86.73 HISTORY OF STROKE: Status: ACTIVE | Noted: 2024-01-30

## 2024-01-30 PROBLEM — R42 VERTIGO: Status: ACTIVE | Noted: 2024-01-30

## 2024-01-30 PROBLEM — I10 HYPERTENSION: Status: ACTIVE | Noted: 2024-01-30

## 2024-01-30 PROBLEM — Z93.2 ILEOSTOMY IN PLACE (MULTI): Status: ACTIVE | Noted: 2022-01-11

## 2024-01-30 PROBLEM — J34.89 NASAL CONGESTION WITH RHINORRHEA: Status: ACTIVE | Noted: 2024-01-30

## 2024-01-30 PROBLEM — E78.5 HYPERLIPIDEMIA: Status: ACTIVE | Noted: 2023-11-01

## 2024-01-30 PROBLEM — Z90.49 ACQUIRED ABSENCE OF OTHER SPECIFIED PARTS OF DIGESTIVE TRACT: Status: ACTIVE | Noted: 2023-11-01

## 2024-01-30 PROBLEM — G47.419 NARCOLEPSY WITHOUT CATAPLEXY (HHS-HCC): Status: ACTIVE | Noted: 2023-11-02

## 2024-01-30 PROBLEM — D51.0 ANEMIA, PERNICIOUS: Status: ACTIVE | Noted: 2024-01-30

## 2024-02-02 DIAGNOSIS — N40.1 BENIGN PROSTATIC HYPERPLASIA WITH URINARY RETENTION: ICD-10-CM

## 2024-02-02 DIAGNOSIS — R33.8 BENIGN PROSTATIC HYPERPLASIA WITH URINARY RETENTION: ICD-10-CM

## 2024-02-02 PROBLEM — R00.2 PALPITATIONS: Status: ACTIVE | Noted: 2024-02-02

## 2024-02-02 RX ORDER — TAMSULOSIN HYDROCHLORIDE 0.4 MG/1
0.4 CAPSULE ORAL DAILY
Qty: 30 CAPSULE | Refills: 2 | Status: SHIPPED | OUTPATIENT
Start: 2024-02-02

## 2024-02-22 PROBLEM — K21.00 GASTROESOPHAGEAL REFLUX DISEASE WITH ESOPHAGITIS: Status: ACTIVE | Noted: 2023-07-18

## 2024-02-22 PROBLEM — I69.392: Status: ACTIVE | Noted: 2023-04-25

## 2024-02-22 PROBLEM — H92.10 OTORRHEA: Status: ACTIVE | Noted: 2024-02-22

## 2024-02-22 PROBLEM — G95.9 CERVICAL MYELOPATHY (MULTI): Status: ACTIVE | Noted: 2024-01-30

## 2024-02-22 PROBLEM — I20.89 OTHER FORMS OF ANGINA PECTORIS (CMS-HCC): Status: ACTIVE | Noted: 2023-04-25

## 2024-02-22 PROBLEM — D64.9 ANEMIA: Status: ACTIVE | Noted: 2023-04-25

## 2024-02-22 PROBLEM — H60.63 CHRONIC OTITIS EXTERNA OF BOTH EARS: Status: ACTIVE | Noted: 2024-02-22

## 2024-02-22 PROBLEM — H60.60 CHRONIC OTITIS EXTERNA: Status: ACTIVE | Noted: 2024-02-22

## 2024-02-22 PROBLEM — G98.8 NEUROLOGICAL DISORDER: Status: ACTIVE | Noted: 2024-02-22

## 2024-02-22 PROBLEM — N40.0 HYPERPLASIA OF PROSTATE: Status: ACTIVE | Noted: 2022-07-21

## 2024-02-22 PROBLEM — K92.9 DISORDER OF DIGESTIVE TRACT: Status: ACTIVE | Noted: 2023-04-25

## 2024-02-22 PROBLEM — R06.03 RESPIRATORY DISTRESS: Status: ACTIVE | Noted: 2024-01-30

## 2024-02-22 PROBLEM — E53.8 COBALAMIN DEFICIENCY: Status: ACTIVE | Noted: 2023-04-25

## 2024-02-22 PROBLEM — M79.89 FOOT SWELLING: Status: ACTIVE | Noted: 2024-02-22

## 2024-02-22 PROBLEM — D89.89 LAMBDA LIGHT CHAIN DISEASE (MULTI): Status: ACTIVE | Noted: 2024-01-30

## 2024-02-22 PROBLEM — H90.5 SENSORINEURAL HEARING LOSS (SNHL): Status: ACTIVE | Noted: 2024-01-30

## 2024-02-22 PROBLEM — R04.0 EPISTAXIS: Status: ACTIVE | Noted: 2024-02-22

## 2024-02-22 PROBLEM — M54.50 LOW BACK PAIN, UNSPECIFIED: Status: ACTIVE | Noted: 2023-04-25

## 2024-02-22 PROBLEM — D69.6 LOW PLATELET COUNT (CMS-HCC): Status: ACTIVE | Noted: 2023-04-25

## 2024-02-22 PROBLEM — R20.2 PARESTHESIA: Status: ACTIVE | Noted: 2024-02-22

## 2024-02-22 PROBLEM — B02.9 HERPES ZOSTER: Status: ACTIVE | Noted: 2024-02-22

## 2024-02-22 PROBLEM — K08.9 DISORDER OF TEETH AND SUPPORTING STRUCTURES: Status: ACTIVE | Noted: 2024-01-30

## 2024-02-22 PROBLEM — B37.2 CANDIDIASIS OF SKIN: Status: ACTIVE | Noted: 2024-02-22

## 2024-02-22 PROBLEM — G56.00 CARPAL TUNNEL SYNDROME: Status: ACTIVE | Noted: 2024-02-22

## 2024-02-22 PROBLEM — M79.89 SWELLING OF HAND: Status: ACTIVE | Noted: 2024-01-30

## 2024-02-22 PROBLEM — R10.9 ABDOMINAL PAIN: Status: ACTIVE | Noted: 2022-01-11

## 2024-02-22 PROBLEM — F19.21 HISTORY OF SUBSTANCE DEPENDENCE (MULTI): Status: ACTIVE | Noted: 2023-04-25

## 2024-02-22 PROBLEM — H91.93 BILATERAL HEARING LOSS: Status: ACTIVE | Noted: 2024-01-30

## 2024-02-22 PROBLEM — J34.89 NASAL DISCHARGE: Status: ACTIVE | Noted: 2024-02-22

## 2024-02-22 PROBLEM — Z86.79 HISTORY OF HYPERTENSION: Status: ACTIVE | Noted: 2024-02-22

## 2024-02-22 PROBLEM — H93.13 TINNITUS OF BOTH EARS: Status: ACTIVE | Noted: 2024-01-30

## 2024-02-22 PROBLEM — R20.1 HYPESTHESIA: Status: ACTIVE | Noted: 2024-02-22

## 2024-02-22 PROBLEM — R35.0 INCREASED FREQUENCY OF URINATION: Status: ACTIVE | Noted: 2022-07-21

## 2024-02-22 PROBLEM — H61.20 EXCESSIVE CERUMEN IN EAR CANAL: Status: ACTIVE | Noted: 2024-01-30

## 2024-02-22 RX ORDER — UBIDECARENONE 75 MG
CAPSULE ORAL
COMMUNITY
Start: 2016-11-11

## 2024-02-22 RX ORDER — CLOPIDOGREL BISULFATE 75 MG/1
TABLET ORAL
COMMUNITY
Start: 2019-04-22 | End: 2024-03-11 | Stop reason: WASHOUT

## 2024-02-28 ASSESSMENT — ENCOUNTER SYMPTOMS
ORTHOPNEA: 0
SYNCOPE: 0
FEVER: 0
PALPITATIONS: 1
HEMATOCHEZIA: 0
NEAR-SYNCOPE: 0
ALTERED MENTAL STATUS: 0
SHORTNESS OF BREATH: 0
HEMATURIA: 0
VOMITING: 0
DYSPNEA ON EXERTION: 0
CHILLS: 0
IRREGULAR HEARTBEAT: 0
COUGH: 0
WHEEZING: 0
NAUSEA: 0

## 2024-02-28 NOTE — PROGRESS NOTES
"Chief Complaint/Reason for Visit:  No chief complaint on file. 6 month cardiovascular follow up    History Of Present Illness:    Verónica Silva is a 68 y.o. adult that presents to the office for 6 month follow up.    Taking medications as prescribed.     PMH significant for HTN, DL, CVA (residual right facial weakness)), anemia, abnormal gait, cervical radiculopathy, vertigo, thrombocytopenia, vit B12/D deficiency.     Patient reports a squeezing/pressure, pulsing pain in the middle of the chest - this occurs with activity such as cleaning, riding the bus or shopping and can last all day. Using a step ladder will \"set of the chest pain right away\". + Associated dyspnea on exertion and palpitations. This has been going on for one year. No h/o syncope, but she does have presyncope (lightheadedness). Chest pain is relieved with rest at times, but she does admit that the pain sometimes lasts all day. Ambulates with a cane.  ***    Past Medical History:  She has a past medical history of Acute pharyngitis, unspecified (01/20/2017), Acute pharyngitis, unspecified (08/04/2020), Acute respiratory distress, Acute sinusitis, unspecified (04/11/2017), Anosmia (08/04/2020), Candidiasis of skin and nail (07/31/2018), Cellulitis, unspecified (01/21/2021), Colostomy status (CMS/[facility]) (01/17/2018), Cough, unspecified (07/22/2020), Dysuria (07/21/2022), Encounter for immunization (12/09/2019), Endocrine disorder, unspecified (04/22/2019), Gastro-esophageal reflux disease with esophagitis, without bleeding, Headache, unspecified (08/04/2020), Hyperlipidemia, unspecified (04/29/2020), Localized swelling, mass and lump, right upper limb (11/20/2017), Lower abdominal pain, unspecified (01/17/2018), Nausea (08/04/2020), Nausea with vomiting, unspecified (07/22/2020), Other allergy status, other than to drugs and biological substances, Other conditions influencing health status (11/20/2017), Other conditions influencing health status " (10/04/2017), Other difficulties with micturition (06/26/2017), Other disorders of nervous system, Other fatigue (04/29/2020), Other fecal abnormalities (01/17/2018), Other specified soft tissue disorders (11/20/2017), Other specified soft tissue disorders (01/21/2021), Pain in right knee (04/04/2016), Paresthesia of skin (02/24/2016), Paresthesia of skin (06/25/2018), Personal history of other (healed) physical injury and trauma (01/20/2017), Personal history of other diseases of the circulatory system, Personal history of other diseases of the musculoskeletal system and connective tissue, Personal history of other diseases of the respiratory system (01/19/2016), Personal history of other diseases of the respiratory system (10/04/2017), Personal history of other diseases of the respiratory system (11/20/2017), Personal history of other specified conditions (05/03/2016), Personal history of other specified conditions (08/04/2020), Poor urinary stream (05/07/2019), Rash and other nonspecific skin eruption (09/21/2017), Shortness of breath (02/24/2016), Thrombocytopenia, unspecified (CMS/HCC) (01/18/2018), Transient cerebral ischemic attack, unspecified (04/22/2019), Unspecified abnormalities of gait and mobility (04/22/2019), Unspecified symptoms and signs involving the genitourinary system (03/19/2020), Vitamin B12 deficiency anemia due to intrinsic factor deficiency (09/21/2017), Vitamin D deficiency, unspecified (01/11/2021), Vomiting, unspecified (01/17/2018), and Zoster without complications (10/16/2015).    Past Surgical History:  She has a past surgical history that includes Other surgical history (06/28/2019); Other surgical history (06/28/2019); Ileostomy (03/18/2014); Other surgical history (03/18/2014); Other surgical history (03/18/2014); Tonsillectomy (03/18/2014); Myringotomy w/ tubes (03/18/2014); Other surgical history (03/18/2014); Cholecystectomy (03/18/2014); and Other surgical history  (03/18/2014).      Social History:  She has no history on file for tobacco use, alcohol use, and drug use.    Family History:  No family history on file.     Allergies:  Bee venom protein (honey bee), Latex, Morphine, Pregabalin, Acetaminophen, Cephalexin, Eggplant, Gabapentin, Ibuprofen, Iodine-131, Shellfish containing products, Topiramate, and Tramadol    Review of Systems   Constitutional: Negative for chills and fever.   Cardiovascular:  Positive for chest pain (chronic, but improved on carvedilol and amlodipine) and palpitations (chronic, but improved with carvedilol). Negative for dyspnea on exertion, irregular heartbeat, leg swelling, near-syncope, orthopnea and syncope.   Respiratory:  Negative for cough, shortness of breath and wheezing.    Gastrointestinal:  Negative for hematochezia, melena, nausea and vomiting.   Genitourinary:  Negative for hematuria.   Psychiatric/Behavioral:  Negative for altered mental status.        Objective      Vitals reviewed.   Constitutional:       Appearance: Healthy appearance.   Pulmonary:      Effort: Pulmonary effort is normal.      Breath sounds: Normal breath sounds.   Cardiovascular:      PMI at left midclavicular line. Normal rate. Regular rhythm. S1 with normal intensity. S2 with normal intensity.       Murmurs: There is no murmur.   Edema:     Peripheral edema absent.   Abdominal:      General: Bowel sounds are normal.   Skin:     General: Skin is warm and dry.   Psychiatric:         Attention and Perception: Attention normal.         Mood and Affect: Mood normal.         Behavior: Behavior is cooperative.         Current Outpatient Medications   Medication Instructions    albuterol 90 mcg/actuation inhaler INHALE TWO PUFFS BY MOUTH EVERY 4 HOURS AS NEEDED FOR WHEEZING, SHORTNESS OF BREATH, OR cough (USE WITH spacer)    amLODIPine (NORVASC) 5 mg, oral, Daily, as directed    aspirin 81 mg EC tablet oral    atorvastatin (LIPITOR) 80 mg, oral, Nightly    azelastine  (Astelin) 137 mcg (0.1 %) nasal spray 1 spray, nasal, 2 times daily    azelastine-fluticasone (Dymista) 137-50 mcg/spray nasal spray nasal    Belbuca 750 mcg buccal film PLACE ONE FILM TO THE GUMS BUCALLY EVERY TWELVE HOURS    Breo Ellipta 200-25 mcg/dose inhaler INHALE 1 PUFF BY MOUTH INTO THE LUNGS EVERY DAY    budesonide-formoteroL (Symbicort) 160-4.5 mcg/actuation inhaler 2 puffs, inhalation    buprenorphine (Belbuca) 600 mcg buccal film APPLY ONE FILM TO THE GUM EVERY TWELVE HOURS 4/19/19    buprenorphine (Belbuca) 900 mcg buccal film buccal    buprenorphine (BELBUCA) 750 mg, buccal, Every 12 hours    carvedilol (Coreg) 3.125 mg tablet 1 tablet, oral, 2 times daily    carvedilol (COREG) 12.5 mg, oral, 2 times daily with meals    carvedilol (COREG) 6.25 mg, oral, 2 times daily    cholecalciferol (VITAMIN D-3) 50,000 Units, oral, Weekly    clopidogrel (Plavix) 75 mg tablet oral    cyanocobalamin (Vitamin B-12) 500 mcg tablet oral, Daily RT    cyclobenzaprine (Flexeril) 10 mg tablet TAKE 1/2 TO 1 TABLET BY MOUTH TWICE DAILY AS NEEDED FOR 30 DAYS    dicyclomine (Bentyl) 10 mg capsule TAKE ONE CAPSULE BY MOUTH FOUR TIMES DAILY AS NEEDED FOR ABDOMINAL cramping OR DIARRHEA  Strength: 10 mg    EPINEPHrine (EPIPEN) 0.3 mg, intramuscular    ergocalciferol (Vitamin D-2) 1.25 MG (43169 UT) capsule     etodolac (Lodine) 400 mg tablet     fluticasone (Flonase) 50 mcg/actuation nasal spray 2 sprays, Daily, Shake liquid prior to use.    fluticasone propion-salmeteroL (Advair Diskus) 250-50 mcg/dose diskus inhaler 1 puff, inhalation    furosemide (Lasix) 20 mg tablet     gabapentin (NEURONTIN) 300 mg, oral    ketoconazole (NIZOral) 2 % cream Topical    Lipitor 40 mg, oral    loratadine (Claritin) 10 mg tablet 1 tablet, oral, Daily    meloxicam (Mobic) 15 mg tablet 1 tablet, oral, Daily    meloxicam (MOBIC) 7.5 mg, oral, Daily RT    naloxone (Narcan) 4 mg/0.1 mL nasal spray as directed Nasally as directed for 1 day    omeprazole  "(PRILOSEC) 20 mg, oral, 2 times daily    ondansetron (Zofran) 4 mg tablet Take 1 tablet (4 mg) by mouth every 12 hours as needed for nausea or vomiting.    oxyCODONE (Roxicodone) 10 mg immediate release tablet Every 6 hours    oxyCODONE (Roxicodone) 5 mg immediate release tablet Take 1 tablet by mouth every four to six hours while awake as needed for pain    promethazine (Phenergan) 25 mg tablet 1 tablet, oral, Every 8 hours PRN    sodium chloride (Ayr) 0.65 % nasal drops 2-6 sprays, nasal    sodium chloride-Aloe vera gel (Ayr Saline) gel topical gel     tamsulosin (FLOMAX) 0.4 mg, oral, Daily        Last Labs:  CBC -  Lab Results   Component Value Date    WBC 6.1 04/21/2023    HGB 13.6 04/21/2023    HCT 41.0 04/21/2023    MCV 87 04/21/2023     04/21/2023       RENAL FUNCTION PANEL -   Lab Results   Component Value Date    GLUCOSE 128 (H) 04/21/2023     04/21/2023    K 3.7 04/21/2023     (H) 04/21/2023    CO2 23 04/21/2023    ANIONGAP 11 04/21/2023    BUN 15 04/21/2023    CREATININE 0.75 04/21/2023    CALCIUM 8.8 04/21/2023    ALBUMIN 3.9 03/16/2021        CMP -  Lab Results   Component Value Date    CALCIUM 8.8 04/21/2023    PROT 6.3 (L) 03/16/2021    ALBUMIN 3.9 03/16/2021    AST 17 03/16/2021    ALT 15 03/16/2021    ALKPHOS 143 (H) 03/16/2021    BILITOT 0.6 03/16/2021       LIPID PANEL -   Lab Results   Component Value Date    CHOL 120 03/16/2021    TRIG 103 03/16/2021    HDL 40.2 03/16/2021    CHHDL 3.0 03/16/2021    LDLF 59 03/16/2021    VLDL 21 03/16/2021     No results found for: \"LDLCALC\"    Lab Results   Component Value Date    HGBA1C 6.0 03/16/2021       Lab Results   Component Value Date    TSH 3.01 03/16/2021       No results found for this or any previous visit.     Last Cardiology Tests:    [facility] 4/25/2023 showed angiographically near normal coronaries.     TTE 3/7/23 showed LV systolic function is normal with an EF of 60%. Normal pattern of LV diastolic filling. No regional wall " motion abnormalities.     Event recorder 3/24/2023 through 4/7/2023 showed no VT, pauses or AV block. Patient had a minimum heart rate of 59 bpm, max heart rate of 179 bpm and average heart rate of 78 bpm. Predominant underlying rhythm was sinus rhythm. Bundle branch block/IVCD was present. 8 episodes of SVT occurred, the run with the fastest interval lasting 5 beats with a max rate of 179 bpm. Longest lasting 4.2 seconds with an average rate of 112 bpm.    There were no vitals taken for this visit.    Assessment/Plan   There were no encounter diagnoses.    1. Chest pain; abnormal stress test  Stress test Feb 2023 with gabby-infarct ischemia. She was started on carvedilol and amlodipine.  Continue amlodipine 5 mg daily (had lower extremity edema on the 10 mg dose)  Continue carvedilol 12.5 mg BID  Continue aspirin 81 mg daily  TTE Feb 2023 with normal LVEF 60%  [facility] April 2023 with angiographically near normal coronaries     2. HTN  Stable  Continue carvedilol to 12.5 mg twice daily  Continue amlodipine to 5 mg daily (had lower extremity edema on the 10 mg dose)     3. Palpitations  14 day Holter monitor March/April 2023 with brief episodes of pSVT, average HR 78 bpm. No afib  Palpitations improved some after increasing carvedilol  Continue carvedilol 12.5 mg BID  Avoid caffeine    Sharon Leone, APRN-CNP

## 2024-03-01 ENCOUNTER — APPOINTMENT (OUTPATIENT)
Dept: CARDIOLOGY | Facility: CLINIC | Age: 69
End: 2024-03-01
Payer: COMMERCIAL

## 2024-03-08 ENCOUNTER — TELEPHONE (OUTPATIENT)
Dept: CARDIOLOGY | Facility: CLINIC | Age: 69
End: 2024-03-08
Payer: COMMERCIAL

## 2024-03-08 PROBLEM — I47.10 PAROXYSMAL SUPRAVENTRICULAR TACHYCARDIA (CMS-HCC): Status: ACTIVE | Noted: 2024-03-08

## 2024-03-08 ASSESSMENT — ENCOUNTER SYMPTOMS
DYSPNEA ON EXERTION: 0
SYNCOPE: 0
COUGH: 0
ALTERED MENTAL STATUS: 0
HEMATOCHEZIA: 0
ORTHOPNEA: 0
NEAR-SYNCOPE: 0
FEVER: 0
WHEEZING: 0
VOMITING: 0
HEMATURIA: 0
NAUSEA: 0
CHILLS: 0
IRREGULAR HEARTBEAT: 0

## 2024-03-08 NOTE — PATIENT INSTRUCTIONS
Recommend Mediterranean style of eating  Increase carvedilol 25 mg twice a day  Follow-up with KOSTA Herrera in 3 months  If you have any questions or cardiac concerns, please call our office at [phone].

## 2024-03-08 NOTE — PROGRESS NOTES
Chief Complaint/Reason for Visit:  6 month follow up 6 month cardiovascular follow up    History Of Present Illness:    Verónica Silva is a 68 y.o. adult that presents to the office for 6 month follow up.    Taking medications as prescribed.     PMH significant for HTN, DL, CVA (residual right facial weakness)), anemia, pSVT, abnormal gait, cervical radiculopathy, vertigo, thrombocytopenia, vit B12/D deficiency.     She does not monitor her BP at home. She reports that after eating a full regular sized meal she will experience palpitations and SOB. She has h/o gastroparesis.  She will typically take a nap after eating and will feel better in an hour.        Past Medical History:  She has a past medical history of Acute pharyngitis, unspecified (01/20/2017), Acute pharyngitis, unspecified (08/04/2020), Acute respiratory distress, Acute sinusitis, unspecified (04/11/2017), Anosmia (08/04/2020), Candidiasis of skin and nail (07/31/2018), Cellulitis, unspecified (01/21/2021), Colostomy status (CMS/[facility]) (01/17/2018), Cough, unspecified (07/22/2020), Dysuria (07/21/2022), Encounter for immunization (12/09/2019), Endocrine disorder, unspecified (04/22/2019), Gastro-esophageal reflux disease with esophagitis, without bleeding, Headache, unspecified (08/04/2020), Hyperlipidemia, unspecified (04/29/2020), Localized swelling, mass and lump, right upper limb (11/20/2017), Lower abdominal pain, unspecified (01/17/2018), Nausea (08/04/2020), Nausea with vomiting, unspecified (07/22/2020), Other allergy status, other than to drugs and biological substances, Other conditions influencing health status (11/20/2017), Other conditions influencing health status (10/04/2017), Other difficulties with micturition (06/26/2017), Other disorders of nervous system, Other fatigue (04/29/2020), Other fecal abnormalities (01/17/2018), Other specified soft tissue disorders (11/20/2017), Other specified soft tissue disorders (01/21/2021), Pain in  right knee (04/04/2016), Paresthesia of skin (02/24/2016), Paresthesia of skin (06/25/2018), Personal history of other (healed) physical injury and trauma (01/20/2017), Personal history of other diseases of the circulatory system, Personal history of other diseases of the musculoskeletal system and connective tissue, Personal history of other diseases of the respiratory system (01/19/2016), Personal history of other diseases of the respiratory system (10/04/2017), Personal history of other diseases of the respiratory system (11/20/2017), Personal history of other specified conditions (05/03/2016), Personal history of other specified conditions (08/04/2020), Poor urinary stream (05/07/2019), Rash and other nonspecific skin eruption (09/21/2017), Shortness of breath (02/24/2016), Thrombocytopenia, unspecified (CMS/HCC) (01/18/2018), Transient cerebral ischemic attack, unspecified (04/22/2019), Unspecified abnormalities of gait and mobility (04/22/2019), Unspecified symptoms and signs involving the genitourinary system (03/19/2020), Vitamin B12 deficiency anemia due to intrinsic factor deficiency (09/21/2017), Vitamin D deficiency, unspecified (01/11/2021), Vomiting, unspecified (01/17/2018), and Zoster without complications (10/16/2015).    Past Surgical History:  She has a past surgical history that includes Other surgical history (06/28/2019); Other surgical history (06/28/2019); Ileostomy (03/18/2014); Other surgical history (03/18/2014); Other surgical history (03/18/2014); Tonsillectomy (03/18/2014); Myringotomy w/ tubes (03/18/2014); Other surgical history (03/18/2014); Cholecystectomy (03/18/2014); and Other surgical history (03/18/2014).      Social History:  She reports that she has never smoked. She has never used smokeless tobacco. No history on file for alcohol use and drug use.    Family History:  No family history on file.     Allergies:  Bee venom protein (honey bee), Latex, Morphine, Pregabalin,  Acetaminophen, Cephalexin, Eggplant, Gabapentin, Ibuprofen, Iodine-131, Shellfish containing products, Topiramate, and Tramadol    Review of Systems   Constitutional: Negative for chills and fever.   Cardiovascular:  Positive for leg swelling (foot swelling - intermittent) and palpitations (worse after eating a meal). Negative for chest pain (chronic, but improved on carvedilol and amlodipine), dyspnea on exertion, irregular heartbeat, near-syncope, orthopnea and syncope.   Respiratory:  Positive for shortness of breath (after eating a full meal). Negative for cough and wheezing.    Musculoskeletal:  Positive for back pain.   Gastrointestinal:  Negative for hematochezia, melena, nausea and vomiting.   Genitourinary:  Negative for hematuria.   Psychiatric/Behavioral:  Negative for altered mental status.      Objective      Vitals reviewed.   Constitutional:       Appearance: Chronically ill-appearing.   Pulmonary:      Effort: Pulmonary effort is normal.      Breath sounds: Normal breath sounds.   Cardiovascular:      PMI at left midclavicular line. Normal rate. Regular rhythm. S1 with normal intensity. S2 with normal intensity.       Murmurs: There is no murmur.   Edema:     Peripheral edema absent.   Abdominal:      General: Bowel sounds are normal.   Skin:     General: Skin is warm and dry.   Psychiatric:         Attention and Perception: Attention normal.         Mood and Affect: Mood normal.         Behavior: Behavior is cooperative.         Current Outpatient Medications   Medication Instructions    albuterol 90 mcg/actuation inhaler INHALE TWO PUFFS BY MOUTH EVERY 4 HOURS AS NEEDED FOR WHEEZING, SHORTNESS OF BREATH, OR cough (USE WITH spacer)    amLODIPine (NORVASC) 5 mg, oral, Daily, as directed    atorvastatin (LIPITOR) 80 mg, oral, Nightly    azelastine (Astelin) 137 mcg (0.1 %) nasal spray 1 spray, nasal, 2 times daily    Belbuca 750 mcg buccal film PLACE ONE FILM TO THE GUMS BUCALLY EVERY TWELVE HOURS     Breo Ellipta 200-25 mcg/dose inhaler INHALE 1 PUFF BY MOUTH INTO THE LUNGS EVERY DAY    budesonide-formoteroL (Symbicort) 160-4.5 mcg/actuation inhaler 2 puffs, inhalation    carvedilol (COREG) 12.5 mg, oral, 2 times daily with meals    cyanocobalamin (Vitamin B-12) 500 mcg tablet oral, Daily RT    cyclobenzaprine (Flexeril) 10 mg tablet TAKE 1/2 TO 1 TABLET BY MOUTH TWICE DAILY AS NEEDED FOR 30 DAYS    dicyclomine (Bentyl) 10 mg capsule TAKE ONE CAPSULE BY MOUTH FOUR TIMES DAILY AS NEEDED FOR ABDOMINAL cramping OR DIARRHEA  Strength: 10 mg    EPINEPHrine (EPIPEN) 0.3 mg, intramuscular    fluticasone (Flonase) 50 mcg/actuation nasal spray 2 sprays, Daily, Shake liquid prior to use.    fluticasone propion-salmeteroL (Advair Diskus) 250-50 mcg/dose diskus inhaler 1 puff, inhalation    Lipitor 40 mg, oral    loratadine (Claritin) 10 mg tablet 1 tablet, oral, Daily    naloxone (Narcan) 4 mg/0.1 mL nasal spray as directed Nasally as directed for 1 day    omeprazole (PRILOSEC) 20 mg, oral, 2 times daily    ondansetron (Zofran) 4 mg tablet Take 1 tablet (4 mg) by mouth every 12 hours as needed for nausea or vomiting.    oxyCODONE (Roxicodone) 5 mg immediate release tablet Take 1 tablet by mouth every four to six hours while awake as needed for pain    tamsulosin (FLOMAX) 0.4 mg, oral, Daily        Last Labs:  CBC -  Lab Results   Component Value Date    WBC 6.1 04/21/2023    HGB 13.6 04/21/2023    HCT 41.0 04/21/2023    MCV 87 04/21/2023     04/21/2023       RENAL FUNCTION PANEL -   Lab Results   Component Value Date    GLUCOSE 128 (H) 04/21/2023     04/21/2023    K 3.7 04/21/2023     (H) 04/21/2023    CO2 23 04/21/2023    ANIONGAP 11 04/21/2023    BUN 15 04/21/2023    CREATININE 0.75 04/21/2023    CALCIUM 8.8 04/21/2023    ALBUMIN 3.9 03/16/2021        CMP -  Lab Results   Component Value Date    CALCIUM 8.8 04/21/2023    PROT 6.3 (L) 03/16/2021    ALBUMIN 3.9 03/16/2021    AST 17 03/16/2021    ALT 15  "03/16/2021    ALKPHOS 143 (H) 03/16/2021    BILITOT 0.6 03/16/2021       LIPID PANEL -   Lab Results   Component Value Date    CHOL 120 03/16/2021    TRIG 103 03/16/2021    HDL 40.2 03/16/2021    CHHDL 3.0 03/16/2021    LDLF 59 03/16/2021    VLDL 21 03/16/2021     No results found for: \"LDLCALC\"    Lab Results   Component Value Date    HGBA1C 6.0 03/16/2021       Lab Results   Component Value Date    TSH 3.01 03/16/2021       No results found for this or any previous visit.     Last Cardiology Tests:    [facility] 4/25/2023 showed angiographically near normal coronaries.     TTE 3/7/23 showed LV systolic function is normal with an EF of 60%. Normal pattern of LV diastolic filling. No regional wall motion abnormalities.     Event recorder 3/24/2023 through 4/7/2023 showed no VT, pauses or AV block. Patient had a minimum heart rate of 59 bpm, max heart rate of 179 bpm and average heart rate of 78 bpm. Predominant underlying rhythm was sinus rhythm. Bundle branch block/IVCD was present. 8 episodes of SVT occurred, the run with the fastest interval lasting 5 beats with a max rate of 179 bpm. Longest lasting 4.2 seconds with an average rate of 112 bpm.    Visit Vitals  /86 (BP Location: Left arm, Patient Position: Sitting, BP Cuff Size: Adult)   Pulse 76   Ht 1.651 m (5' 5\")   Wt 93.9 kg (207 lb)   SpO2 96%   BMI 34.45 kg/m²   Smoking Status Never   BSA 2.08 m²       Assessment/Plan   The primary encounter diagnosis was Hypertension, unspecified type. A diagnosis of Paroxysmal supraventricular tachycardia was also pertinent to this visit.    1. Chest pain; abnormal stress test  Continue amlodipine 5 mg daily (had lower extremity edema on the 10 mg dose)  Increase carvedilol 25 mg BID  Continue aspirin 81 mg daily  TTE Feb 2023 with normal LVEF 60%  Stress test Feb 2023 with gabby-infarct ischemia. She was started on carvedilol and amlodipine.  [facility] April 2023 with angiographically near normal coronaries     2. " HTN  Elevated.  She has not taken her morning carvedilol or amlodipine yet today, but has been having issues with palpitations after eating.  Increase carvedilol to 25 mg twice daily  Continue amlodipine to 5 mg daily (had lower extremity edema on the 10 mg dose)     3. Palpitations; paroxysmal SVT  14 day Holter monitor March/April 2023 with brief episodes of pSVT, average HR 78 bpm. No afib  Palpitations improved some after increasing carvedilol  Having palpitations and SOB after eating a meal. She does report h/o gastroparesis and follows with GI.  Increase carvedilol 25 mg BID  Avoid caffeine    Sharon Leone, APRN-CNP

## 2024-03-11 ENCOUNTER — OFFICE VISIT (OUTPATIENT)
Dept: CARDIOLOGY | Facility: CLINIC | Age: 69
End: 2024-03-11
Payer: COMMERCIAL

## 2024-03-11 VITALS
BODY MASS INDEX: 34.49 KG/M2 | SYSTOLIC BLOOD PRESSURE: 158 MMHG | HEIGHT: 65 IN | DIASTOLIC BLOOD PRESSURE: 86 MMHG | OXYGEN SATURATION: 96 % | WEIGHT: 207 LBS | HEART RATE: 76 BPM

## 2024-03-11 DIAGNOSIS — I47.10 PAROXYSMAL SUPRAVENTRICULAR TACHYCARDIA (CMS-HCC): ICD-10-CM

## 2024-03-11 DIAGNOSIS — I10 HYPERTENSION, UNSPECIFIED TYPE: Primary | ICD-10-CM

## 2024-03-11 PROCEDURE — 1036F TOBACCO NON-USER: CPT | Performed by: NURSE PRACTITIONER

## 2024-03-11 PROCEDURE — 1160F RVW MEDS BY RX/DR IN RCRD: CPT | Performed by: NURSE PRACTITIONER

## 2024-03-11 PROCEDURE — 3077F SYST BP >= 140 MM HG: CPT | Performed by: NURSE PRACTITIONER

## 2024-03-11 PROCEDURE — 3079F DIAST BP 80-89 MM HG: CPT | Performed by: NURSE PRACTITIONER

## 2024-03-11 PROCEDURE — 99213 OFFICE O/P EST LOW 20 MIN: CPT | Performed by: NURSE PRACTITIONER

## 2024-03-11 PROCEDURE — 1159F MED LIST DOCD IN RCRD: CPT | Performed by: NURSE PRACTITIONER

## 2024-03-11 RX ORDER — CARVEDILOL 25 MG/1
25 TABLET ORAL
Qty: 180 TABLET | Refills: 1 | Status: SHIPPED | OUTPATIENT
Start: 2024-03-11 | End: 2024-09-07

## 2024-03-11 SDOH — ECONOMIC STABILITY: FOOD INSECURITY: WITHIN THE PAST 12 MONTHS, YOU WORRIED THAT YOUR FOOD WOULD RUN OUT BEFORE YOU GOT MONEY TO BUY MORE.: NEVER TRUE

## 2024-03-11 SDOH — ECONOMIC STABILITY: FOOD INSECURITY: WITHIN THE PAST 12 MONTHS, THE FOOD YOU BOUGHT JUST DIDN'T LAST AND YOU DIDN'T HAVE MONEY TO GET MORE.: NEVER TRUE

## 2024-03-11 ASSESSMENT — ENCOUNTER SYMPTOMS
SHORTNESS OF BREATH: 1
PALPITATIONS: 1
BACK PAIN: 1

## 2024-03-19 ENCOUNTER — OFFICE VISIT (OUTPATIENT)
Dept: PRIMARY CARE | Facility: CLINIC | Age: 69
End: 2024-03-19
Payer: COMMERCIAL

## 2024-03-19 VITALS
SYSTOLIC BLOOD PRESSURE: 122 MMHG | RESPIRATION RATE: 16 BRPM | TEMPERATURE: 96.9 F | WEIGHT: 204 LBS | HEART RATE: 76 BPM | BODY MASS INDEX: 33.95 KG/M2 | DIASTOLIC BLOOD PRESSURE: 70 MMHG | OXYGEN SATURATION: 98 %

## 2024-03-19 DIAGNOSIS — Z86.73 HISTORY OF TRANSIENT ISCHEMIC ATTACK (TIA): ICD-10-CM

## 2024-03-19 DIAGNOSIS — B36.9 FUNGAL RASH OF TORSO: ICD-10-CM

## 2024-03-19 DIAGNOSIS — I10 ESSENTIAL HYPERTENSION, BENIGN: Primary | ICD-10-CM

## 2024-03-19 DIAGNOSIS — Z00.00 MEDICARE ANNUAL WELLNESS VISIT, SUBSEQUENT: ICD-10-CM

## 2024-03-19 DIAGNOSIS — E78.00 PURE HYPERCHOLESTEROLEMIA: ICD-10-CM

## 2024-03-19 DIAGNOSIS — M51.37 DEGENERATION OF LUMBAR OR LUMBOSACRAL INTERVERTEBRAL DISC: ICD-10-CM

## 2024-03-19 DIAGNOSIS — L84 CALLUS OF TOE: ICD-10-CM

## 2024-03-19 PROCEDURE — 1160F RVW MEDS BY RX/DR IN RCRD: CPT | Performed by: INTERNAL MEDICINE

## 2024-03-19 PROCEDURE — 1170F FXNL STATUS ASSESSED: CPT | Performed by: INTERNAL MEDICINE

## 2024-03-19 PROCEDURE — G0439 PPPS, SUBSEQ VISIT: HCPCS | Performed by: INTERNAL MEDICINE

## 2024-03-19 PROCEDURE — 3078F DIAST BP <80 MM HG: CPT | Performed by: INTERNAL MEDICINE

## 2024-03-19 PROCEDURE — 99214 OFFICE O/P EST MOD 30 MIN: CPT | Performed by: INTERNAL MEDICINE

## 2024-03-19 PROCEDURE — 1036F TOBACCO NON-USER: CPT | Performed by: INTERNAL MEDICINE

## 2024-03-19 PROCEDURE — 3074F SYST BP LT 130 MM HG: CPT | Performed by: INTERNAL MEDICINE

## 2024-03-19 PROCEDURE — 1159F MED LIST DOCD IN RCRD: CPT | Performed by: INTERNAL MEDICINE

## 2024-03-19 RX ORDER — KETOCONAZOLE 20 MG/G
CREAM TOPICAL DAILY
Qty: 60 G | Refills: 3 | Status: SHIPPED | OUTPATIENT
Start: 2024-03-19

## 2024-03-19 ASSESSMENT — ACTIVITIES OF DAILY LIVING (ADL)
TAKING_MEDICATION: INDEPENDENT
GROCERY_SHOPPING: INDEPENDENT
DRESSING: INDEPENDENT
MANAGING_FINANCES: INDEPENDENT
DOING_HOUSEWORK: INDEPENDENT
BATHING: INDEPENDENT

## 2024-03-19 ASSESSMENT — ENCOUNTER SYMPTOMS
LOSS OF SENSATION IN FEET: 0
OCCASIONAL FEELINGS OF UNSTEADINESS: 1
DEPRESSION: 0

## 2024-03-19 NOTE — PROGRESS NOTES
Chief Complaint:   Medicare Wellness Exam/Comprehensive Problem Focused Follow Up and Physical Exam    HPI:  She [place]  [place] , by self and her brother lives one floor below.  She has chronic pain , multiple medical issues and has ileostomy.   She uses cane , and able to do ADLs. No recent fall , injury  She has bilateral LE tingling , numbness and feeling of burning over feet soles, more so at night.  MRI of L S spine reviewed with her , she has multilevel spondylosis and varying degree  of central canal stenosis / neural foraminal narrowing.  Surgery was postponed for L S Spine, per Dr Izaguirre and she had heart cath per dr Chung.  Leg swelling has resolved now. She will get L S spine surgery in late October. .   She goes to Pain management and gets Belbuca film and oxycodone   She has seen Dr Ronald Camara, and had UGI , SBFT study, advised to have low fiber diet    PMH:  Immun/Inj. Record:  91301-Covid 19 Moderna Sars-Cov-2vac mRNA, LNP-S, PF, 100 mcg/ 0.5 mL 03/30/21 02/21/21  90732-Pneumovax 23 08/12/21  68431-Ydyzqau High Dose  55908-682-90 11/03/21  Medical Problems:  Anemia, Carpal tunnel syndrome on both sides, Cervical radiculopathy, Hyperlipidemia, Hypertension  Lumbar spinal stenosis - s/p lumbar epidural injectons  Peripheral neuropathy, TIA (transient ischemic attack)  Surgical Hx:  Past Surgeries - History of Cholecystectomy    History of Dental Surgery    History of Ear pressure equalization tube insertion    History of Excision Of Facial Bone    History of Excision Of Turbinate    Ileostomy    History of Myringotomy - With Ventilating Tube Insertion    History of Nose surgery    History of Sex Reassignment Surgery    2008, associated with her improvement    History of Tonsillectomy With Adenoidectomy    FH:  Family history      Family history of cardiac disorder      Family history of diabetes mellitus      Family history of malignant neoplasm     Family history of mental disorder      Family  history of Heart problem      Family history of asthm     Family history of cardiac disorder      Family history of autism      Family history of schizophrenia      Family history of Tourette disorder    SH:  Personal Habits:  Cigarette Use: Never Smoked Cigarettes.Alcohol: Denies alcohol use.Drug Use: Denies Drug Use.Daily Caffeine: Consumes on average 1 soda per day, consumes chocolate frequently.    Review of Systems   Constitutional:  Negative for activity change, fatigue and fever.        Up with a walker , assistance   HENT:  Negative for congestion.    Eyes:  Negative for redness and visual disturbance.   Respiratory:  Negative for cough, shortness of breath and wheezing.    Cardiovascular:  Negative for chest pain, palpitations and leg swelling.   Gastrointestinal:  Negative for abdominal pain, blood in stool and constipation.   Endocrine: Negative.    Genitourinary:  Negative for dysuria, frequency and urgency.        +OAB   Musculoskeletal:  Positive for arthralgias, back pain and gait problem. Negative for joint swelling.   Skin:  Rash under breasts , itching  Allergic/Immunologic: Negative.    Neurological:  Positive for weakness. Negative for dizziness, numbness and headaches.   Hematological:  Negative for adenopathy.   Psychiatric/Behavioral:  Negative for agitation and behavioral problems.       /70 (BP Location: Left arm, Patient Position: Sitting, BP Cuff Size: Adult)   Pulse 76   Temp 36.1 °C (96.9 °F) (Temporal)   Resp 16   Wt 92.5 kg (204 lb)   SpO2 98%   BMI 33.95 kg/m²    Physical Exam  Constitutional:       Appearance: Normal appearance.   HENT:      Nose: No congestion.      Mouth/Throat:      Mouth: Mucous membranes are moist.      Pharynx: Oropharynx is clear.   Eyes:      Conjunctiva/sclera: Conjunctivae normal.   Cardiovascular:      Rate and Rhythm: Normal rate and regular rhythm.      Pulses: Normal pulses.      Heart sounds: Normal heart sounds. No murmur  heard.  Pulmonary:      Effort: Pulmonary effort is normal.      Breath sounds: Normal breath sounds. No wheezing or rales.      Dorsal kyphosis  Abdominal:      General: Abdomen is flat. Bowel sounds are normal.      Palpations: Abdomen is soft.      Hernia: No hernia is present.      Comments: Ileostomy site is ok   Musculoskeletal:         General: No swelling or tenderness. Normal range of motion.      Cervical back: Normal range of motion and neck supple.      Right lower leg: No edema.      Left lower leg: No edema.   Skin:     General: Skin is warm and dry.      Capillary Refill: Capillary refill takes less than 2 seconds.      Findings: Pinkish fungal rash under left breast     Comments: Lumbar surgical scar well healed   Neurological:      General: No focal deficit present.      Mental Status: She is alert and oriented to person, place, and time.   Psychiatric:         Mood and Affect: Mood normal.         Behavior: Behavior normal.         Assessment/Plan     Lumbar spine DJD:  S/p surgery per Dr Izaguirre   Postop rehab finished at [facility]  Continue OT and PT , pain control  Belbuca film and Oxycodone   She goes to pain management   Up with assist, walker    OAB:  Followup with Dr Acevedo at [facility]    Fungal rash :  Nizoral cream for rash under breast  Fluconazole did not help  Hygienic precautions    Hypertension:  Amlodipine 5mg daily  Carvediol 25mg bid  BILL diet  She follows with Dr Chung  Seen by CNP on 3/11    Hyperlipidemia:  Statin    H/o TIA :  ASA and statin    Ileostomy status:  GI / surgery followup  No leak    She saw VARSHA Cole  at [facility] , had motility studies ( [facility] )  EGD and Flex sig were done   She had UGI and SBFT also per Dr Camara  Low residue diet  Bentyl prn for cramps , and zofran prn for nausea             Vision care  She would wait to get flushot, Covid booster  Mammogram yearly   Check labs      Tobacco/Alcohol/Opioid use, as well as Illicit Drug Use was screened for/reviewed  and documented in Social Documentation section of the chart and medication list as appropriate    Allergies and Medications  Bee venom protein (honey bee), Latex, Morphine, Pregabalin, Acetaminophen, Cephalexin, Eggplant, Gabapentin, Ibuprofen, Iodine-131, Shellfish containing products, Topiramate, and Tramadol  Current Outpatient Medications   Medication Instructions    albuterol 90 mcg/actuation inhaler INHALE TWO PUFFS BY MOUTH EVERY 4 HOURS AS NEEDED FOR WHEEZING, SHORTNESS OF BREATH, OR cough (USE WITH spacer)    amLODIPine (NORVASC) 5 mg, oral, Daily, as directed    atorvastatin (LIPITOR) 80 mg, oral, Nightly    azelastine (Astelin) 137 mcg (0.1 %) nasal spray 1 spray, nasal, 2 times daily    Belbuca 750 mcg buccal film PLACE ONE FILM TO THE GUMS BUCALLY EVERY TWELVE HOURS    Breo Ellipta 200-25 mcg/dose inhaler INHALE 1 PUFF BY MOUTH INTO THE LUNGS EVERY DAY    budesonide-formoteroL (Symbicort) 160-4.5 mcg/actuation inhaler 2 puffs, inhalation    carvedilol (COREG) 25 mg, oral, 2 times daily with meals    cyanocobalamin (Vitamin B-12) 500 mcg tablet oral, Daily RT    cyclobenzaprine (Flexeril) 10 mg tablet TAKE 1/2 TO 1 TABLET BY MOUTH TWICE DAILY AS NEEDED FOR 30 DAYS    dicyclomine (Bentyl) 10 mg capsule TAKE ONE CAPSULE BY MOUTH FOUR TIMES DAILY AS NEEDED FOR ABDOMINAL cramping OR DIARRHEA  Strength: 10 mg    EPINEPHrine (EPIPEN) 0.3 mg, intramuscular    fluticasone (Flonase) 50 mcg/actuation nasal spray 2 sprays, Daily, Shake liquid prior to use.    fluticasone propion-salmeteroL (Advair Diskus) 250-50 mcg/dose diskus inhaler 1 puff, inhalation    loratadine (Claritin) 10 mg tablet 1 tablet, oral, Daily    naloxone (Narcan) 4 mg/0.1 mL nasal spray as directed Nasally as directed for 1 day    omeprazole (PRILOSEC) 20 mg, oral, 2 times daily    ondansetron (Zofran) 4 mg tablet Take 1 tablet (4 mg) by mouth every 12 hours as needed for nausea or vomiting.    oxyCODONE (Roxicodone) 5 mg immediate release  tablet Take 1 tablet by mouth every four to six hours while awake as needed for pain    tamsulosin (FLOMAX) 0.4 mg, oral, Daily     Medications and Supplements  prescribed by me and other practitioners or clinical pharmacist (such as prescriptions, OTC's, herbal therapies and supplements) were reviewed and documented in the medical record.      Activities of Daily Living  In your present state of health, do you have any difficulty performing the following activities?:   Preparing food and eating?: Yes  Bathing yourself: Yes  Getting dressed: Yes  Using the toilet:Yes  Moving around from place to place: Yes  In the past year have you fallen or had a near fall?:No  Able to manage finances independently: Yes  Able to perform grocery shopping: Yes  Able to manage medications independently: Yes  Able to do housework independently: Yes  Patient self-assessment of health status? Fair    Depression Screen  Depression screening (15m) completed using the PHQ-2 questions with results documented in the chart/encounter  (See note and/or Rooming Screenings for documentation)    Current exercise habits: limited mobility   Dietary issues discussed: Yes  Hearing difficulties: No  Safe in current home environment: Yes  Visual Acuity assessed: No  Cognitive Impairment No    Advance directives  Advanced Care Planning (including a Living Will, Healthcare POA, as well as specific end of life choices and/or directives), was discussed (~16 min) with the patient and/or surrogate, voluntarily, and details of that discussion documented in the Problem List (under Advanced Directives Discussion) of the medical record.       Vitals  /70 (BP Location: Left arm, Patient Position: Sitting, BP Cuff Size: Adult)   Pulse 76   Temp 36.1 °C (96.9 °F) (Temporal)   Resp 16   Wt 92.5 kg (204 lb)   SpO2 98%   BMI 33.95 kg/m²   Body mass index is 33.95 kg/m².

## 2024-04-11 ENCOUNTER — OFFICE VISIT (OUTPATIENT)
Dept: PRIMARY CARE | Facility: CLINIC | Age: 69
End: 2024-04-11
Payer: COMMERCIAL

## 2024-04-11 VITALS
BODY MASS INDEX: 34.39 KG/M2 | HEIGHT: 65 IN | HEART RATE: 68 BPM | WEIGHT: 206.4 LBS | DIASTOLIC BLOOD PRESSURE: 70 MMHG | OXYGEN SATURATION: 96 % | TEMPERATURE: 97 F | SYSTOLIC BLOOD PRESSURE: 128 MMHG

## 2024-04-11 DIAGNOSIS — M25.512 ACUTE PAIN OF LEFT SHOULDER: Primary | ICD-10-CM

## 2024-04-11 PROCEDURE — 1159F MED LIST DOCD IN RCRD: CPT | Performed by: INTERNAL MEDICINE

## 2024-04-11 PROCEDURE — 3078F DIAST BP <80 MM HG: CPT | Performed by: INTERNAL MEDICINE

## 2024-04-11 PROCEDURE — 99214 OFFICE O/P EST MOD 30 MIN: CPT | Performed by: INTERNAL MEDICINE

## 2024-04-11 PROCEDURE — 3074F SYST BP LT 130 MM HG: CPT | Performed by: INTERNAL MEDICINE

## 2024-04-11 PROCEDURE — 1160F RVW MEDS BY RX/DR IN RCRD: CPT | Performed by: INTERNAL MEDICINE

## 2024-04-11 NOTE — PROGRESS NOTES
Chief Complaint:   Routine followup , and she has left shoulder , arm pain    HPI:  She lives  in [facility] , by self and her brother lives one floor below.  She has chronic pain , multiple medical issues and has ileostomy.   She uses cane , and able to do ADLs. No recent fall , injury  She has bilateral LE tingling , numbness and feeling of burning over feet soles, more so at night.  She tried to reach in upper cabinet , and heard snap on her left arm/shoulder , a week ago.  She did not seek medical attention, She has pain and reduced movement of left arm.  MRI of L S spine reviewed with her , she has multilevel spondylosis and varying degree  of central canal stenosis / neural foraminal narrowing.  Surgery was postponed for L S Spine, per Dr Izaguirre and she had heart cath per Dr Chung.  Leg swelling has resolved now. She will get L S spine surgery later on.  She goes to Pain management and gets Belbuca film and oxycodone   She has seen Dr Ronald Camara, and had UGI , SBFT study, advised to have low fiber diet        Review of Systems   Constitutional:  Negative for activity change, fatigue and fever.        Up with a walker , assistance   HENT:  Negative for congestion.    Eyes:  Negative for redness and visual disturbance.   Respiratory:  Negative for cough, shortness of breath and wheezing.    Cardiovascular:  Negative for chest pain, palpitations and leg swelling.   Gastrointestinal:  Negative for abdominal pain, blood in stool and constipation.   Endocrine: Negative.    Genitourinary:  Negative for dysuria, frequency and urgency.        +OAB   Musculoskeletal:  Positive for arthralgias, back pain and gait problem. Negative for joint swelling.   Skin:  Rash under breasts , itching  Allergic/Immunologic: Negative.    Neurological:  Positive for weakness. Negative for dizziness, numbness and headaches.   Hematological:  Negative for adenopathy.   Psychiatric/Behavioral:  Negative for agitation and behavioral  "problems.        /70 (BP Location: Right arm, Patient Position: Sitting, BP Cuff Size: Adult)   Pulse 68   Temp 36.1 ° (97 °F) (Temporal)   Ht 1.651 m (5' 5\")   Wt 93.6 kg (206 lb 6.4 oz)   SpO2 96%   BMI 34.35 kg/m²     Physical Exam  Constitutional:       Appearance: Normal appearance.   HENT:      Nose: No congestion.      Mouth/Throat:      Mouth: Mucous membranes are moist.      Pharynx: Oropharynx is clear.   Eyes:      Conjunctiva/sclera: Conjunctivae normal.   Cardiovascular:      Rate and Rhythm: Normal rate and regular rhythm.      Pulses: Normal pulses.      Heart sounds: Normal heart sounds. No murmur heard.  Pulmonary:      Effort: Pulmonary effort is normal.      Breath sounds: Normal breath sounds. No wheezing or rales.      Dorsal kyphosis  Abdominal:      General: Abdomen is flat. Bowel sounds are normal.      Palpations: Abdomen is soft.      Hernia: No hernia is present.      Comments: Ileostomy site is ok   Musculoskeletal:         Left shoulder : reduced ROM, and induces pain      Cervical back: Normal range of motion and neck supple.      Right lower leg: No edema.      Left lower leg: No edema.   Skin:     General: Skin is warm and dry.      Capillary Refill: Capillary refill takes less than 2 seconds.      Findings: Pinkish fungal rash under left breast     Comments: Lumbar surgical scar well healed   Neurological:      General: No focal deficit present.      Mental Status: She is alert and oriented to person, place, and time.   Psychiatric:         Mood and Affect: Mood normal.         Behavior: Behavior normal.         Assessment/Plan     Left shoulder / arm pain:  Rest , Ice or heat , analgesics, voltaren gel, lidocaine patches  Xray of left shoulder     Lumbar spine DJD:  S/p surgery per Dr Izaguirre   Postop rehab finished at NH  Continue OT and PT , pain control  Belbuca film and Oxycodone   She goes to pain management   Up with assist, walker    OAB:  Followup with Dr Acevedo at " [facility]    Fungal rash :  Ketoconazole  for rash under breast  Fluconazole did not help  Hygienic precautions    Hypertension:  Amlodipine 5mg daily  Carvediol 25mg bid  BILL diet  She follows with Dr Chung  Seen by CNP on 3/11    Hyperlipidemia:  Statin    H/o TIA :  ASA and statin    Ileostomy status:  GI / surgery followup  No leak    She saw VARSHA Cole  at [facility] , had motility studies ( [facility] )  EGD and Flex sig were done   She had UGI and SBFT also per Dr Camara  Low residue diet  Bentyl prn for cramps , and zofran prn for nausea             Vision care  She would wait to get flushot, Covid booster  Mammogram yearly   Check labs

## 2024-04-15 NOTE — RESULT ENCOUNTER NOTE
Shoulder stretching exercises, and use Ice prn  Ibuprofen , or tylenol , or aleve prn for pain  May see Ortho / sports med for local injection , if above treatments fail

## 2024-04-30 ENCOUNTER — APPOINTMENT (OUTPATIENT)
Dept: AUDIOLOGY | Facility: CLINIC | Age: 69
End: 2024-04-30
Payer: COMMERCIAL

## 2024-05-07 DIAGNOSIS — H90.3 SENSORINEURAL HEARING LOSS, BILATERAL: ICD-10-CM

## 2024-05-07 DIAGNOSIS — J31.0 CHRONIC RHINITIS: Primary | ICD-10-CM

## 2024-05-07 RX ORDER — FLUTICASONE PROPIONATE 50 MCG
2 SPRAY, SUSPENSION (ML) NASAL DAILY
Qty: 16 G | Refills: 6 | Status: SHIPPED | OUTPATIENT
Start: 2024-05-07

## 2024-05-21 ENCOUNTER — CLINICAL SUPPORT (OUTPATIENT)
Dept: AUDIOLOGY | Facility: CLINIC | Age: 69
End: 2024-05-21
Payer: COMMERCIAL

## 2024-05-21 DIAGNOSIS — H90.3 SENSORINEURAL HEARING LOSS, ASYMMETRICAL: Primary | ICD-10-CM

## 2024-05-21 PROCEDURE — 1159F MED LIST DOCD IN RCRD: CPT | Performed by: AUDIOLOGIST

## 2024-05-21 PROCEDURE — 92557 COMPREHENSIVE HEARING TEST: CPT | Performed by: AUDIOLOGIST

## 2024-05-21 PROCEDURE — 1160F RVW MEDS BY RX/DR IN RCRD: CPT | Performed by: AUDIOLOGIST

## 2024-05-21 PROCEDURE — 92567 TYMPANOMETRY: CPT | Performed by: AUDIOLOGIST

## 2024-05-21 NOTE — PROGRESS NOTES
[facility]  AUDIOMETRIC EVALUATION      Name:  Verónica Silva   :  1955  Age:  68 y.o.  Date of Evaluation:  24    HISTORY    Verónica Silva is seen today for an updated audiogram.  Her last test was in .  She is also interested in a hearing aid for the left ear.    EVALUATION  See scanned audiogram in Media and included at the end of this report.    RESULTS    Otoscopic Evaluation:  Right Ear:  clear   Left Ear:  clear    Tympanometry:   Right Ear:  Type A, consistent with normal eardrum mobility and middle ear pressure   Left Ear:  Type A, consistent with normal eardrum mobility and middle ear pressure    Acoustic reflexes were not completed    Pure Tone Audiometry:    Right Ear:  normal to mild sensorineural hearing loss  Left Ear:  mild to moderate sensorineural hearing loss       Speech Audiometry:    Right Ear:  excellent in quiet at a normal presentation level  Left Ear:  excellent in quiet at an elevated presentation level  Speech reception thresholds were in good agreement with pure tone testing.    DISCUSSION  Results were relayed to the patient and she was provided with a copy of the audiogram.  She was advised to contact her insurance for the location of a [facility] hearing aid office.    APPOINTMENT TIME  3:30-4:00pm      Mani Ahuja  Doctor of Audiology  Senior Audiologist

## 2024-06-17 ENCOUNTER — APPOINTMENT (OUTPATIENT)
Dept: CARDIOLOGY | Facility: CLINIC | Age: 69
End: 2024-06-17
Payer: COMMERCIAL

## 2024-07-05 ENCOUNTER — APPOINTMENT (OUTPATIENT)
Dept: CARDIOLOGY | Facility: CLINIC | Age: 69
End: 2024-07-05
Payer: COMMERCIAL

## 2024-07-18 ENCOUNTER — APPOINTMENT (OUTPATIENT)
Dept: PRIMARY CARE | Facility: CLINIC | Age: 69
End: 2024-07-18
Payer: COMMERCIAL

## 2024-07-18 VITALS
DIASTOLIC BLOOD PRESSURE: 78 MMHG | RESPIRATION RATE: 16 BRPM | WEIGHT: 206 LBS | BODY MASS INDEX: 34.32 KG/M2 | HEART RATE: 75 BPM | SYSTOLIC BLOOD PRESSURE: 145 MMHG | HEIGHT: 65 IN

## 2024-07-18 DIAGNOSIS — E78.00 PURE HYPERCHOLESTEROLEMIA: ICD-10-CM

## 2024-07-18 DIAGNOSIS — Z12.31 ENCOUNTER FOR SCREENING MAMMOGRAM FOR MALIGNANT NEOPLASM OF BREAST: ICD-10-CM

## 2024-07-18 DIAGNOSIS — Z86.73 HISTORY OF TRANSIENT ISCHEMIC ATTACK (TIA): ICD-10-CM

## 2024-07-18 DIAGNOSIS — M51.37 DEGENERATION OF LUMBAR OR LUMBOSACRAL INTERVERTEBRAL DISC: ICD-10-CM

## 2024-07-18 DIAGNOSIS — I10 ESSENTIAL HYPERTENSION, BENIGN: Primary | ICD-10-CM

## 2024-07-18 DIAGNOSIS — Z00.00 MEDICARE ANNUAL WELLNESS VISIT, SUBSEQUENT: ICD-10-CM

## 2024-07-18 PROCEDURE — 3008F BODY MASS INDEX DOCD: CPT | Performed by: INTERNAL MEDICINE

## 2024-07-18 PROCEDURE — 1036F TOBACCO NON-USER: CPT | Performed by: INTERNAL MEDICINE

## 2024-07-18 PROCEDURE — 1159F MED LIST DOCD IN RCRD: CPT | Performed by: INTERNAL MEDICINE

## 2024-07-18 PROCEDURE — 3078F DIAST BP <80 MM HG: CPT | Performed by: INTERNAL MEDICINE

## 2024-07-18 PROCEDURE — 3077F SYST BP >= 140 MM HG: CPT | Performed by: INTERNAL MEDICINE

## 2024-07-18 PROCEDURE — 99214 OFFICE O/P EST MOD 30 MIN: CPT | Performed by: INTERNAL MEDICINE

## 2024-07-18 NOTE — PROGRESS NOTES
Chief Complaint:   Routine followup , and followup for left shoulder , arm pain    HPI:  She lives  in [place] , by self and her brother lives one floor below.  She has chronic pain , multiple medical issues and has ileostomy.   She uses cane , and able to do ADLs. No recent fall , injury  She has bilateral LE tingling , numbness and feeling of burning over feet soles, more so at night.    She has pain and reduced movement of left arm.She will start PT soon  MRI of L S spine reviewed with her , she has multilevel spondylosis and varying degree  of central canal stenosis / neural foraminal narrowing.  Surgery was postponed for L S Spine, per Dr Izaguirre and she had heart cath per Dr Chung.  Leg swelling has resolved now. She will get L S spine surgery later on.  She goes to Pain management and gets Belbuca film and oxycodone , OARRS reviewed  She uses [employer] transportation service , uses cane        Review of Systems   Constitutional:  Negative for activity change, fatigue and fever.        Up with a walker , assistance   HENT:  Negative for congestion.    Eyes:  Negative for redness and visual disturbance.   Respiratory:  Negative for cough, shortness of breath and wheezing.    Cardiovascular:  Negative for chest pain, palpitations and leg swelling.   Gastrointestinal:  Negative for abdominal pain, blood in stool and constipation.   Endocrine: Negative.    Genitourinary:  Negative for dysuria, frequency and urgency.        +OAB   Musculoskeletal:  Positive for arthralgias, back pain and gait problem. Negative for joint swelling.   She has right big toe callus  Skin:  Rash under breasts , itching and in groin area  Allergic/Immunologic: Negative.    Neurological:  Positive for weakness. Negative for dizziness, numbness and headaches.   Hematological:  Negative for adenopathy.   Psychiatric/Behavioral:  Negative for agitation and behavioral problems.        7/18/2024 2:46 PM    /78   Pulse 75   Resp 16   Weight  "93.4 kg (206 lb)   Height 1.651 m (5' 5\")    Other Vitals   BMI 34.28 kg/m2   BSA 2.07 m2          Physical Exam  Constitutional:       Appearance: Normal appearance.   HENT:      Nose: No congestion.      Mouth/Throat:      Mouth: Mucous membranes are moist.      Pharynx: Oropharynx is clear.   Eyes:      Conjunctiva/sclera: Conjunctivae normal.   Cardiovascular:      Rate and Rhythm: Normal rate and regular rhythm.      Pulses: Normal pulses.      Heart sounds: Normal heart sounds. No murmur heard.  Pulmonary:      Effort: Pulmonary effort is normal.      Breath sounds: Normal breath sounds. No wheezing or rales.      Dorsal kyphosis  Abdominal:      General: Abdomen is flat. Bowel sounds are normal.      Palpations: Abdomen is soft.      Hernia: No hernia is present.      Comments: Ileostomy site is ok   Musculoskeletal:         Left shoulder : reduced ROM, and induces pain      Cervical back: Normal range of motion and neck supple.      Right lower leg: No edema.      Left lower leg: No edema.      Right  big toe Callus  Skin:     General: Skin is warm and dry.      Capillary Refill: Capillary refill takes less than 2 seconds.      Findings: Pinkish fungal rash under left breast     Comments: Lumbar surgical scar well healed   Neurological:      General: No focal deficit present.      Mental Status: She is alert and oriented to person, place, and time.   Psychiatric:         Mood and Affect: Mood normal.         Behavior: Behavior normal.         Assessment/Plan     Left shoulder / arm pain:  Rest , Ice or heat , analgesics, voltaren gel, lidocaine patches  Xray of left shoulder shows AC joint arthritis  Stretching exercise, PT soon    Lumbar spine DJD:  S/p surgery per Dr Izaguirre   Postop rehab finished at NH  Continue OT and PT , pain control  Belbuca film and Oxycodone   She goes to pain management   Up with assist, walker    OAB:  Followup with Dr Acevedo  or Issa Stephens at [facility]" :  Ketoconazole  for rash under breast  Fluconazole did not help  Hygienic precautions    Hypertension:  Amlodipine 5mg daily  Carvediol 25mg bid  BILL diet  She follows with Dr Chung      Hyperlipidemia:  Statin    H/o TIA :  ASA and statin    Ileostomy status:  GI / surgery followup  No leak    Low residue diet  Bentyl prn for cramps , and zofran prn for nausea             Vision care  She would wait to get flushot, Covid booster  Mammogram yearly   Check labs

## 2024-07-26 ENCOUNTER — APPOINTMENT (OUTPATIENT)
Dept: CARDIOLOGY | Facility: CLINIC | Age: 69
End: 2024-07-26
Payer: COMMERCIAL

## 2024-07-31 ASSESSMENT — ENCOUNTER SYMPTOMS
SYNCOPE: 0
BACK PAIN: 1
NEAR-SYNCOPE: 0
ORTHOPNEA: 0
FEVER: 0
WHEEZING: 0
IRREGULAR HEARTBEAT: 0
HEMATOCHEZIA: 0
NAUSEA: 0
PALPITATIONS: 1
ALTERED MENTAL STATUS: 0
HEMATURIA: 0
DYSPNEA ON EXERTION: 0
COUGH: 0
SHORTNESS OF BREATH: 1
CHILLS: 0
VOMITING: 0

## 2024-07-31 NOTE — PATIENT INSTRUCTIONS
Recommend Mediterranean style of eating  Continue current medications  Follow-up with Dr. Lam in 6 months  If you have any questions or cardiac concerns, please call our office at [phone].

## 2024-07-31 NOTE — PROGRESS NOTES
Chief Complaint/Reason for Visit:  Follow-up 4 month cardiovascular follow up    History Of Present Illness:    Verónica Silva is a 68 y.o. adult that presents to the office for 4 month follow up.    Taking medications as prescribed.     PMH significant for HTN, DL, CVA (residual right facial weakness)), anemia, pSVT, abnormal gait, cervical radiculopathy, gastroparesis, vertigo, thrombocytopenia, vit B12/D deficiency.      During last office visit her carvedilol was increased to 25 mg BID d/t palpitations and elevated BP.  She reports that chest pain and palpitations have improved on 25 mg dose of carvedilol.  She states she mainly will feel palpitations if she is having issues with her asthma.    Ambulates with a cane.    EKG personally reviewed today showed SR with RBBB with HR 71 bpm.  This will go to the cardiologist for final review.     Past Medical History:  She has a past medical history of Acute pharyngitis, unspecified (01/20/2017), Acute pharyngitis, unspecified (08/04/2020), Acute respiratory distress, Acute sinusitis, unspecified (04/11/2017), Anosmia (08/04/2020), Candidiasis of skin and nail (07/31/2018), Cellulitis, unspecified (01/21/2021), Colostomy status (Multi) (01/17/2018), Cough, unspecified (07/22/2020), Dysuria (07/21/2022), Encounter for immunization (12/09/2019), Endocrine disorder, unspecified (04/22/2019), Gastro-esophageal reflux disease with esophagitis, without bleeding, Headache, unspecified (08/04/2020), Hyperlipidemia, unspecified (04/29/2020), Localized swelling, mass and lump, right upper limb (11/20/2017), Lower abdominal pain, unspecified (01/17/2018), Nausea (08/04/2020), Nausea with vomiting, unspecified (07/22/2020), Other allergy status, other than to drugs and biological substances, Other conditions influencing health status (11/20/2017), Other conditions influencing health status (10/04/2017), Other difficulties with micturition (06/26/2017), Other disorders of nervous  system, Other fatigue (04/29/2020), Other fecal abnormalities (01/17/2018), Other specified soft tissue disorders (11/20/2017), Other specified soft tissue disorders (01/21/2021), Pain in right knee (04/04/2016), Paresthesia of skin (02/24/2016), Paresthesia of skin (06/25/2018), Personal history of other (healed) physical injury and trauma (01/20/2017), Personal history of other diseases of the circulatory system, Personal history of other diseases of the musculoskeletal system and connective tissue, Personal history of other diseases of the respiratory system (01/19/2016), Personal history of other diseases of the respiratory system (10/04/2017), Personal history of other diseases of the respiratory system (11/20/2017), Personal history of other specified conditions (05/03/2016), Personal history of other specified conditions (08/04/2020), Poor urinary stream (05/07/2019), Rash and other nonspecific skin eruption (09/21/2017), Shortness of breath (02/24/2016), Thrombocytopenia, unspecified (CMS-HCC) (01/18/2018), Transient cerebral ischemic attack, unspecified (04/22/2019), Unspecified abnormalities of gait and mobility (04/22/2019), Unspecified symptoms and signs involving the genitourinary system (03/19/2020), Vitamin B12 deficiency anemia due to intrinsic factor deficiency (09/21/2017), Vitamin D deficiency, unspecified (01/11/2021), Vomiting, unspecified (01/17/2018), and Zoster without complications (10/16/2015).    Past Surgical History:  She has a past surgical history that includes Other surgical history (06/28/2019); Other surgical history (06/28/2019); Ileostomy (03/18/2014); Other surgical history (03/18/2014); Other surgical history (03/18/2014); Tonsillectomy (03/18/2014); Myringotomy w/ tubes (03/18/2014); Other surgical history (03/18/2014); Cholecystectomy (03/18/2014); and Other surgical history (03/18/2014).      Social History:  She reports that she has never smoked. She has never used smokeless  tobacco. No history on file for alcohol use and drug use.    Family History:  No family history on file.     Allergies:  Bee venom protein (honey bee), Latex, Morphine, Pregabalin, Acetaminophen, Cephalexin, Eggplant, Gabapentin, Ibuprofen, Iodine-131, Shellfish containing products, Topiramate, and Tramadol    Review of Systems   Constitutional: Negative for chills and fever.   Cardiovascular:  Positive for chest pain (chronic, but improved on carvedilol and amlodipine), leg swelling (foot swelling - intermittent) and palpitations (worse after eating a meal). Negative for dyspnea on exertion, irregular heartbeat, near-syncope, orthopnea and syncope.   Respiratory:  Positive for shortness of breath (after eating a full meal). Negative for cough and wheezing.    Musculoskeletal:  Positive for back pain.   Gastrointestinal:  Negative for hematochezia, melena, nausea and vomiting.   Genitourinary:  Negative for hematuria.   Psychiatric/Behavioral:  Negative for altered mental status.      Objective      Vitals reviewed.   Constitutional:       Appearance: Chronically ill-appearing.   Pulmonary:      Effort: Pulmonary effort is normal.      Breath sounds: Normal breath sounds.   Cardiovascular:      PMI at left midclavicular line. Normal rate. Regular rhythm. S1 with normal intensity. S2 with normal intensity.       Murmurs: There is no murmur.   Edema:     Peripheral edema absent.   Abdominal:      General: Bowel sounds are normal.   Skin:     General: Skin is warm and dry.   Psychiatric:         Attention and Perception: Attention normal.         Mood and Affect: Mood normal.         Behavior: Behavior is cooperative.         Current Outpatient Medications   Medication Instructions    albuterol 90 mcg/actuation inhaler INHALE TWO PUFFS BY MOUTH EVERY 4 HOURS AS NEEDED FOR WHEEZING, SHORTNESS OF BREATH, OR cough (USE WITH spacer)    amLODIPine (NORVASC) 5 mg, oral, Daily, as directed    atorvastatin (LIPITOR) 80 mg,  oral, Nightly    azelastine (Astelin) 137 mcg (0.1 %) nasal spray 1 spray, nasal, 2 times daily    Belbuca 750 mcg buccal film PLACE ONE FILM TO THE GUMS BUCALLY EVERY TWELVE HOURS    Breo Ellipta 200-25 mcg/dose inhaler INHALE 1 PUFF BY MOUTH INTO THE LUNGS EVERY DAY    budesonide-formoteroL (Symbicort) 160-4.5 mcg/actuation inhaler 2 puffs, inhalation    carvedilol (COREG) 25 mg, oral, 2 times daily (morning and late afternoon)    cyclobenzaprine (Flexeril) 10 mg tablet TAKE 1/2 TO 1 TABLET BY MOUTH TWICE DAILY AS NEEDED FOR 30 DAYS    dicyclomine (Bentyl) 10 mg capsule TAKE ONE CAPSULE BY MOUTH FOUR TIMES DAILY AS NEEDED FOR ABDOMINAL cramping OR DIARRHEA  Strength: 10 mg    EPINEPHrine (EPIPEN) 0.3 mg, intramuscular    fluticasone (Flonase) 50 mcg/actuation nasal spray 2 sprays, Daily, Shake liquid prior to use.    fluticasone propion-salmeteroL (Advair Diskus) 250-50 mcg/dose diskus inhaler 1 puff, inhalation    ketoconazole (NIZOral) 2 % cream APPLY TO AFFECTED AREA EVERY DAY *2 grams daily*    loratadine (Claritin) 10 mg tablet 1 tablet, oral, Daily    naloxone (Narcan) 4 mg/0.1 mL nasal spray as directed Nasally as directed for 1 day    omeprazole (PRILOSEC) 20 mg, oral, 2 times daily    ondansetron (Zofran) 4 mg tablet TAKE ONE TABLET BY MOUTH EVERY TWELVE HOURS AS NEEDED FOR NAUSEA    oxyCODONE (Roxicodone) 5 mg immediate release tablet Take 1 tablet by mouth every four to six hours while awake as needed for pain    tamsulosin (FLOMAX) 0.4 mg, oral, Daily        Last Labs:  CBC -  Lab Results   Component Value Date    WBC 6.1 04/21/2023    HGB 13.6 04/21/2023    HCT 41.0 04/21/2023    MCV 87 04/21/2023     04/21/2023       RENAL FUNCTION PANEL -   Lab Results   Component Value Date    GLUCOSE 128 (H) 04/21/2023     04/21/2023    K 3.7 04/21/2023     (H) 04/21/2023    CO2 23 04/21/2023    ANIONGAP 11 04/21/2023    BUN 15 04/21/2023    CREATININE 0.75 04/21/2023    CALCIUM 8.8 04/21/2023  "   ALBUMIN 3.9 03/16/2021        CMP -  Lab Results   Component Value Date    CALCIUM 8.8 04/21/2023    PROT 6.3 (L) 03/16/2021    ALBUMIN 3.9 03/16/2021    AST 17 03/16/2021    ALT 15 03/16/2021    ALKPHOS 143 (H) 03/16/2021    BILITOT 0.6 03/16/2021       LIPID PANEL -   Lab Results   Component Value Date    CHOL 120 03/16/2021    TRIG 103 03/16/2021    HDL 40.2 03/16/2021    CHHDL 3.0 03/16/2021    LDLF 59 03/16/2021    VLDL 21 03/16/2021     No results found for: \"LDLCALC\"    Lab Results   Component Value Date    HGBA1C 6.0 03/16/2021       Lab Results   Component Value Date    TSH 3.01 03/16/2021       No results found for this or any previous visit.     Last Cardiology Tests:    [facility] 4/25/2023 showed angiographically near normal coronaries.     TTE 3/7/23 showed LV systolic function is normal with an EF of 60%. Normal pattern of LV diastolic filling. No regional wall motion abnormalities.     Event recorder 3/24/2023 through 4/7/2023 showed no VT, pauses or AV block. Patient had a minimum heart rate of 59 bpm, max heart rate of 179 bpm and average heart rate of 78 bpm. Predominant underlying rhythm was sinus rhythm. Bundle branch block/IVCD was present. 8 episodes of SVT occurred, the run with the fastest interval lasting 5 beats with a max rate of 179 bpm. Longest lasting 4.2 seconds with an average rate of 112 bpm.    Visit Vitals  /78 (BP Location: Left arm, Patient Position: Sitting, BP Cuff Size: Adult)   Pulse 70   Ht 1.651 m (5' 5\")   Wt 97 kg (213 lb 12.8 oz)   SpO2 97%   BMI 35.58 kg/m²   Smoking Status Never   BSA 2.11 m²       Assessment/Plan   The primary encounter diagnosis was Hypertension, unspecified type. Diagnoses of Paroxysmal supraventricular tachycardia (CMS-HCC) and Palpitations were also pertinent to this visit.     1. HTN  Stable  Continue carvedilol 25 mg twice daily and amlodipine to 5 mg daily (had lower extremity edema on the 10 mg dose)     2. Palpitations; paroxysmal SVT  14 " day Holter monitor March/April 2023 with brief episodes of pSVT, average HR 78 bpm. No afib  Palpitations improved after increasing carvedilol  Continue carvedilol 25 mg BID  Avoid caffeine    3. Chest pain; abnormal stress test  Continue amlodipine 5 mg daily (had lower extremity edema on the 10 mg dose)  Continue carvedilol 25 mg BID  Continue aspirin 81 mg daily  TTE Feb 2023 with normal LVEF 60%  Stress test Feb 2023 with gabby-infarct ischemia. She was started on carvedilol and amlodipine.  [facility] April 2023 with angiographically near normal coronaries    Sharon Leone, APRN-CNP

## 2024-08-02 ENCOUNTER — APPOINTMENT (OUTPATIENT)
Dept: CARDIOLOGY | Facility: CLINIC | Age: 69
End: 2024-08-02
Payer: COMMERCIAL

## 2024-08-02 VITALS
DIASTOLIC BLOOD PRESSURE: 78 MMHG | BODY MASS INDEX: 35.62 KG/M2 | SYSTOLIC BLOOD PRESSURE: 142 MMHG | HEART RATE: 70 BPM | HEIGHT: 65 IN | WEIGHT: 213.8 LBS | OXYGEN SATURATION: 97 %

## 2024-08-02 DIAGNOSIS — R00.2 PALPITATIONS: ICD-10-CM

## 2024-08-02 DIAGNOSIS — I10 HYPERTENSION, UNSPECIFIED TYPE: Primary | ICD-10-CM

## 2024-08-02 DIAGNOSIS — I47.10 PAROXYSMAL SUPRAVENTRICULAR TACHYCARDIA (CMS-HCC): ICD-10-CM

## 2024-08-02 RX ORDER — CARVEDILOL 25 MG/1
25 TABLET ORAL
Qty: 180 TABLET | Refills: 3 | Status: SHIPPED | OUTPATIENT
Start: 2024-08-02 | End: 2025-08-02

## 2024-08-02 SDOH — ECONOMIC STABILITY: FOOD INSECURITY: WITHIN THE PAST 12 MONTHS, THE FOOD YOU BOUGHT JUST DIDN'T LAST AND YOU DIDN'T HAVE MONEY TO GET MORE.: NEVER TRUE

## 2024-08-02 SDOH — ECONOMIC STABILITY: FOOD INSECURITY: WITHIN THE PAST 12 MONTHS, YOU WORRIED THAT YOUR FOOD WOULD RUN OUT BEFORE YOU GOT MONEY TO BUY MORE.: NEVER TRUE

## 2024-10-18 DIAGNOSIS — B36.9 FUNGAL RASH OF TORSO: ICD-10-CM

## 2024-10-18 RX ORDER — KETOCONAZOLE 20 MG/G
CREAM TOPICAL
Qty: 60 G | Refills: 0 | Status: SHIPPED | OUTPATIENT
Start: 2024-10-18

## 2024-11-15 DIAGNOSIS — I10 HYPERTENSION, UNSPECIFIED TYPE: ICD-10-CM

## 2024-11-15 DIAGNOSIS — E78.2 MIXED HYPERLIPIDEMIA: ICD-10-CM

## 2024-11-15 DIAGNOSIS — J31.0 CHRONIC RHINITIS: ICD-10-CM

## 2024-11-15 DIAGNOSIS — B36.9 FUNGAL RASH OF TORSO: ICD-10-CM

## 2024-11-15 RX ORDER — ATORVASTATIN CALCIUM 80 MG/1
80 TABLET, FILM COATED ORAL NIGHTLY
Qty: 90 TABLET | Refills: 0 | Status: SHIPPED | OUTPATIENT
Start: 2024-11-15

## 2024-11-15 RX ORDER — AMLODIPINE BESYLATE 5 MG/1
5 TABLET ORAL DAILY
Qty: 90 TABLET | Refills: 0 | Status: SHIPPED | OUTPATIENT
Start: 2024-11-15

## 2024-11-15 RX ORDER — FLUTICASONE PROPIONATE 50 MCG
2 SPRAY, SUSPENSION (ML) NASAL DAILY
Qty: 16 G | Refills: 6 | Status: SHIPPED | OUTPATIENT
Start: 2024-11-15

## 2024-11-15 RX ORDER — KETOCONAZOLE 20 MG/G
CREAM TOPICAL
Qty: 60 G | Refills: 0 | Status: SHIPPED | OUTPATIENT
Start: 2024-11-15

## 2024-11-18 ENCOUNTER — APPOINTMENT (OUTPATIENT)
Dept: PRIMARY CARE | Facility: CLINIC | Age: 69
End: 2024-11-18
Payer: COMMERCIAL

## 2024-12-03 ENCOUNTER — APPOINTMENT (OUTPATIENT)
Dept: PRIMARY CARE | Facility: CLINIC | Age: 69
End: 2024-12-03
Payer: COMMERCIAL

## 2024-12-10 ENCOUNTER — APPOINTMENT (OUTPATIENT)
Dept: PRIMARY CARE | Facility: CLINIC | Age: 69
End: 2024-12-10
Payer: COMMERCIAL

## 2024-12-10 VITALS
RESPIRATION RATE: 18 BRPM | WEIGHT: 222 LBS | SYSTOLIC BLOOD PRESSURE: 130 MMHG | HEART RATE: 90 BPM | HEIGHT: 65 IN | OXYGEN SATURATION: 93 % | BODY MASS INDEX: 36.99 KG/M2 | DIASTOLIC BLOOD PRESSURE: 70 MMHG | TEMPERATURE: 97.2 F

## 2024-12-10 DIAGNOSIS — K29.60 REFLUX GASTRITIS: ICD-10-CM

## 2024-12-10 DIAGNOSIS — Z86.73 HISTORY OF TRANSIENT ISCHEMIC ATTACK (TIA): ICD-10-CM

## 2024-12-10 DIAGNOSIS — R11.2 NAUSEA AND VOMITING, UNSPECIFIED VOMITING TYPE: ICD-10-CM

## 2024-12-10 DIAGNOSIS — B36.9 FUNGAL RASH OF TORSO: ICD-10-CM

## 2024-12-10 DIAGNOSIS — I10 HYPERTENSION, UNSPECIFIED TYPE: ICD-10-CM

## 2024-12-10 DIAGNOSIS — Z00.00 MEDICARE ANNUAL WELLNESS VISIT, SUBSEQUENT: Primary | ICD-10-CM

## 2024-12-10 DIAGNOSIS — E78.2 MIXED HYPERLIPIDEMIA: ICD-10-CM

## 2024-12-10 DIAGNOSIS — I10 ESSENTIAL HYPERTENSION, BENIGN: ICD-10-CM

## 2024-12-10 DIAGNOSIS — E78.00 PURE HYPERCHOLESTEROLEMIA: ICD-10-CM

## 2024-12-10 DIAGNOSIS — M51.379 DEGENERATION OF INTERVERTEBRAL DISC OF LUMBOSACRAL REGION, UNSPECIFIED WHETHER PAIN PRESENT: ICD-10-CM

## 2024-12-10 PROCEDURE — 3075F SYST BP GE 130 - 139MM HG: CPT | Performed by: INTERNAL MEDICINE

## 2024-12-10 PROCEDURE — 3008F BODY MASS INDEX DOCD: CPT | Performed by: INTERNAL MEDICINE

## 2024-12-10 PROCEDURE — 1159F MED LIST DOCD IN RCRD: CPT | Performed by: INTERNAL MEDICINE

## 2024-12-10 PROCEDURE — 1036F TOBACCO NON-USER: CPT | Performed by: INTERNAL MEDICINE

## 2024-12-10 PROCEDURE — 99214 OFFICE O/P EST MOD 30 MIN: CPT | Performed by: INTERNAL MEDICINE

## 2024-12-10 PROCEDURE — 3078F DIAST BP <80 MM HG: CPT | Performed by: INTERNAL MEDICINE

## 2024-12-10 RX ORDER — OMEPRAZOLE 20 MG/1
20 CAPSULE, DELAYED RELEASE ORAL
Qty: 30 CAPSULE | Refills: 3 | Status: SHIPPED | OUTPATIENT
Start: 2024-12-10

## 2024-12-10 RX ORDER — ATORVASTATIN CALCIUM 80 MG/1
80 TABLET, FILM COATED ORAL NIGHTLY
Qty: 90 TABLET | Refills: 0 | Status: SHIPPED | OUTPATIENT
Start: 2024-12-10

## 2024-12-10 RX ORDER — AMLODIPINE BESYLATE 5 MG/1
5 TABLET ORAL DAILY
Qty: 90 TABLET | Refills: 0 | Status: SHIPPED | OUTPATIENT
Start: 2024-12-10

## 2024-12-10 RX ORDER — ONDANSETRON 4 MG/1
4 TABLET, FILM COATED ORAL EVERY 12 HOURS PRN
Qty: 60 TABLET | Refills: 3 | Status: SHIPPED | OUTPATIENT
Start: 2024-12-10

## 2024-12-10 RX ORDER — KETOCONAZOLE 20 MG/G
CREAM TOPICAL DAILY
Qty: 60 G | Refills: 3 | Status: SHIPPED | OUTPATIENT
Start: 2024-12-10

## 2024-12-10 NOTE — PROGRESS NOTES
Chief Complaint:   Routine followup , and followup for left shoulder , arm pain    HPI:  She [place]  in [place] by self and her brother lives one floor below.  She has chronic pain , multiple medical issues and has ileostomy.   She uses cane , and able to do ADLs. No recent fall , injury  She has bilateral LE tingling , numbness and feeling of burning over feet soles, more so at night.    She has pain and reduced movement of left arm.She will start PT soon  MRI of L S spine reviewed with her , she has multilevel spondylosis and varying degree  of central canal stenosis / neural foraminal narrowing.  Surgery was postponed for L S Spine, per Dr Izaguirre and she had heart cath per Dr Chung.  Leg swelling has resolved now. She will get L S spine surgery later on.  She goes to Pain management and gets Belbuca film and oxycodone , OARRS reviewed  She uses [employer] / [employer] transportation service , uses cane        Review of Systems   Constitutional:  Negative for activity change, fatigue and fever.        Up with a walker , assistance   HENT:  Negative for congestion.    Eyes:  Negative for redness and visual disturbance.   Respiratory:  Negative for cough, shortness of breath and wheezing.    Cardiovascular:  Negative for chest pain, palpitations and leg swelling.   Gastrointestinal:  Negative for abdominal pain, blood in stool and constipation. C/o nausea  Endocrine: Negative.    Genitourinary:  Negative for dysuria, frequency and urgency.        +OAB   Musculoskeletal:  Positive for arthralgias, back pain and gait problem. Negative for joint swelling.   She has right big toe callus  Skin:  Rash under breasts , itching and in groin area  Allergic/Immunologic: Negative.    Neurological:  Positive for weakness. Negative for dizziness, numbness and headaches.   Hematological:  Negative for adenopathy.   Psychiatric/Behavioral:  Negative for agitation and behavioral problems.        Flowsheets   12/10/2024    2:08 PM      /70  "  BP Location Left arm   Patient Position Sitting   BP Cuff Size Adult   Heart Rate 90   Temp 36.2 °C (97.2 °F)   Temp Source Temporal   Weight 101 kg (222 lb)   Height 1.651 m (5' 5\")   Resp 18   SpO2 93 %         Tobacco     Smoking status: Never   Smokeless status: Never   Reviewed: 12/10/2024   Other Vitals     BMI: 36.94 kg/m2   BSA: 2.15 m2         Physical Exam  Constitutional:       Appearance: Normal appearance.   HENT:      Nose: No congestion.      Mouth/Throat:      Mouth: Mucous membranes are moist.      Pharynx: Oropharynx is clear.   Eyes:      Conjunctiva/sclera: Conjunctivae normal.   Cardiovascular:      Rate and Rhythm: Normal rate and regular rhythm.      Pulses: Normal pulses.      Heart sounds: Normal heart sounds. No murmur heard.  Pulmonary:      Effort: Pulmonary effort is normal.      Breath sounds: Normal breath sounds. No wheezing or rales.      Dorsal kyphosis  Abdominal:      General: Abdomen is flat. Bowel sounds are normal.      Palpations: Abdomen is soft.      Hernia: No hernia is present.      Comments: Ileostomy site is ok   Musculoskeletal:         Left shoulder : reduced ROM, and induces pain      Cervical back: Normal range of motion and neck supple.      Right lower leg: No edema.      Left lower leg: No edema.      Right  big toe Callus  Skin:     General: Skin is warm and dry. Small lipoma over right upper back     Capillary Refill: Capillary refill takes less than 2 seconds.      Findings: Pinkish fungal rash in right lateral fold     Comments: Lumbar surgical scar well healed   Neurological:      General: No focal deficit present.      Mental Status: She is alert and oriented to person, place, and time.   Psychiatric:         Mood and Affect: Mood normal.         Behavior: Behavior normal.         Assessment/Plan     Left shoulder / arm pain:  Rest , Ice or heat , analgesics, voltaren gel, lidocaine patches  Xray of left shoulder shows AC joint arthritis  Stretching " exercise, PT soon    Lumbar spine DJD:  S/p surgery per Dr Izaguirre   Postop rehab finished at NH  Continue OT and PT , pain control  Belbuca film and Oxycodone   She goes to pain management   Up with assist, walker    OAB:  Followup with Dr Acevedo  or Issa Stephens at [place]    Fungal rash :  Ketoconazole  for rash under breast  Fluconazole did not help  Hygienic precautions    Hypertension:  Amlodipine 5mg daily  Carvediol 25mg bid  BILL diet  She follows with Dr Chung      Hyperlipidemia:  Statin    H/o TIA :  ASA and statin    Ileostomy status:  GI / surgery followup  No leak    Low residue diet  Bentyl prn for cramps , and zofran prn for nausea             Vision care  She would wait to get flushot, Covid booster  Mammogram yearly

## 2025-02-04 ENCOUNTER — APPOINTMENT (OUTPATIENT)
Dept: CARDIOLOGY | Facility: HOSPITAL | Age: 70
End: 2025-02-04
Payer: COMMERCIAL

## 2025-02-04 ENCOUNTER — TELEPHONE (OUTPATIENT)
Dept: CARDIOLOGY | Facility: HOSPITAL | Age: 70
End: 2025-02-04
Payer: COMMERCIAL

## 2025-02-04 NOTE — TELEPHONE ENCOUNTER
Called and spoke with pt regarding follow up with cardiology - pt saw Sharon BRAMBILA Aug 2024 where she ordered a 6 month follow up with Dr. Lam. Patient has previously followed with Dr. Chung. Patient states she discussed switching cardiac care to Dr. Lam at last visit with Sharon. Patient does have transportation issues and will not be able to attend appt today 2/4 with Dr. Lam and will call our office when ready to r/s. Canceled appt for now. Will touch base with Sharon BRAMBILA for confirmation on this switch.

## 2025-03-16 NOTE — PROGRESS NOTES
Counseling:  The patient was counseled regarding diagnostic results, instructions for management, risk factor reductions, prognosis, patient and family education, impressions, risks and benefits of treatment options and importance of compliance with treatment.      Chief Complaint:   The patient presents today for 6-month followup of HTN and SVT.      History Of Present Illness:    Verónica Silva is a 69 y.o. adult patient whose PMH is significant for hyperlipidemia, HTN, SVT, GERD, pernicious anemia, Sjogren's syndrome, TIA, asthma, and narcolepsy. She is a former patient of Dr. Chung and presents today for 6-month followup of HTN and SVT.     Last Recorded Vitals:  There were no vitals filed for this visit.    Past Surgical History:  She has a past surgical history that includes Other surgical history (06/28/2019); Other surgical history (06/28/2019); Ileostomy (03/18/2014); Other surgical history (03/18/2014); Other surgical history (03/18/2014); Tonsillectomy (03/18/2014); Myringotomy w/ tubes (03/18/2014); Other surgical history (03/18/2014); Cholecystectomy (03/18/2014); and Other surgical history (03/18/2014).      Social History:  She reports that she has never smoked. She has never used smokeless tobacco. She reports that she does not drink alcohol and does not use drugs.    Family History:  No family history on file.     Allergies:  Bee venom protein (honey bee), Latex, Morphine, Pregabalin, Acetaminophen, Cephalexin, Eggplant, Gabapentin, Ibuprofen, Iodine-131, Shellfish containing products, Topiramate, and Tramadol    Outpatient Medications:  Current Outpatient Medications   Medication Instructions    albuterol 90 mcg/actuation inhaler INHALE TWO PUFFS BY MOUTH EVERY 4 HOURS AS NEEDED FOR WHEEZING, SHORTNESS OF BREATH, OR cough (USE WITH spacer)    amLODIPine (NORVASC) 5 mg, oral, Daily, as directed    atorvastatin (LIPITOR) 80 mg, oral, Nightly    azelastine (Astelin) 137 mcg (0.1 %) nasal spray 1  spray, nasal, 2 times daily    Belbuca 750 mcg buccal film PLACE ONE FILM TO THE GUMS BUCALLY EVERY TWELVE HOURS    Breo Ellipta 200-25 mcg/dose inhaler INHALE 1 PUFF BY MOUTH INTO THE LUNGS EVERY DAY    budesonide-formoteroL (Symbicort) 160-4.5 mcg/actuation inhaler 2 puffs, inhalation    carvedilol (COREG) 25 mg, oral, 2 times daily (morning and late afternoon)    cyclobenzaprine (Flexeril) 10 mg tablet TAKE 1/2 TO 1 TABLET BY MOUTH TWICE DAILY AS NEEDED FOR 30 DAYS    dicyclomine (Bentyl) 10 mg capsule TAKE ONE CAPSULE BY MOUTH FOUR TIMES DAILY AS NEEDED FOR ABDOMINAL cramping OR DIARRHEA  Strength: 10 mg    EPINEPHrine (EPIPEN) 0.3 mg, intramuscular    fluticasone (Flonase) 50 mcg/actuation nasal spray 2 sprays, Daily, Shake liquid prior to use.    fluticasone propion-salmeteroL (Advair Diskus) 250-50 mcg/dose diskus inhaler 1 puff, inhalation    ketoconazole (NIZOral) 2 % cream Topical, Daily    loratadine (Claritin) 10 mg tablet 1 tablet, oral, Daily    naloxone (Narcan) 4 mg/0.1 mL nasal spray as directed Nasally as directed for 1 day    omeprazole (PRILOSEC) 20 mg, oral, Daily before breakfast    ondansetron (ZOFRAN) 4 mg, oral, Every 12 hours PRN    oxyCODONE (Roxicodone) 5 mg immediate release tablet Take 1 tablet by mouth every four to six hours while awake as needed for pain    tamsulosin (FLOMAX) 0.4 mg, oral, Daily     Review of Systems   All other systems reviewed and are negative.     Physical Exam:  Constitutional:       Appearance: Healthy appearance. Not in distress.   Neck:      Vascular: No JVR. JVD normal.   Pulmonary:      Effort: Pulmonary effort is normal.      Breath sounds: Normal breath sounds. No wheezing. No rhonchi. No rales.   Chest:      Chest wall: Not tender to palpatation.   Cardiovascular:      PMI at left midclavicular line. Normal rate. Regular rhythm. Normal S1. Normal S2.       Murmurs: There is no murmur.      No gallop.  No click. No rub.   Pulses:     Intact distal pulses.    Edema:     Peripheral edema absent.   Abdominal:      General: Bowel sounds are normal.      Palpations: Abdomen is soft.      Tenderness: There is no abdominal tenderness.   Musculoskeletal: Normal range of motion.         General: No tenderness. Skin:     General: Skin is warm and dry.   Neurological:      General: No focal deficit present.      Mental Status: Alert and oriented to person, place and time.          Last Labs:  CBC -  Lab Results   Component Value Date    WBC 6.1 04/21/2023    HGB 13.6 04/21/2023    HCT 41.0 04/21/2023    MCV 87 04/21/2023     04/21/2023       CMP -  Lab Results   Component Value Date    CALCIUM 8.8 04/21/2023    PROT 6.3 (L) 03/16/2021    ALBUMIN 3.9 03/16/2021    AST 17 03/16/2021    ALT 15 03/16/2021    ALKPHOS 143 (H) 03/16/2021    BILITOT 0.6 03/16/2021       LIPID PANEL -   Lab Results   Component Value Date    CHOL 120 03/16/2021    TRIG 103 03/16/2021    HDL 40.2 03/16/2021    CHHDL 3.0 03/16/2021    LDLF 59 03/16/2021    VLDL 21 03/16/2021       RENAL FUNCTION PANEL -   Lab Results   Component Value Date    GLUCOSE 128 (H) 04/21/2023     04/21/2023    K 3.7 04/21/2023     (H) 04/21/2023    CO2 23 04/21/2023    ANIONGAP 11 04/21/2023    BUN 15 04/21/2023    CREATININE 0.75 04/21/2023    CALCIUM 8.8 04/21/2023    ALBUMIN 3.9 03/16/2021        Lab Results   Component Value Date    HGBA1C 6.0 03/16/2021       Last Cardiology Tests:  04/25/2023 - Cardiac Catheterization (LH)  1. Angiographically near normal cors.  2. Mild LV-AO peak to peak pulback gradient (disregard reported numbers, falsely elevated LV systolic pressure due to catheter whip).  3. Left Ventricular end-diastolic pressure = 8.    03/07/2023 - TTE  Left ventricular systolic function is normal with a 60% estimated ejection fraction.     03/07/2023 - Stress Test  1. There is a small sized, partially reversible perfusion defect in the distal anterior and apical walls suspicious for prior infarct  and minimal gabby-infarct ischemia. Calculated ejection fraction is 63% without segmental wall motion  abnormality seen.  2. Normal Stress Test. No clinical or electrocardiographic evidence for ischemia at maximal infusion.       04/03/2019 - TTE  1. Mild concentric left ventricular hypertrophy.  2. Left ventricular systolic function is normal with LVEF of 69%.  3. Trace  mitral and tricuspid regurgitation.  4. The right     Lab review: I have personally reviewed the laboratory result(s).    Assessment/Plan   1) HTN  Stable  On carvedilol 25 mg BID, amlodipine 5 mg daily  Amlodipine 10 mg caused LE edema      2) Palpitations; Paroxysmal SVT  On carvedilol 25 mg BID  14 day Holter March/April 2023 with brief episodes of pSVT, average HR 78 bpm; no a-fib     3) Chest Pain with Abnormal Stress Test  On ASA 81 mg daily, amlodipine 5 mg daily, carvedilol 25 mg BID  TTE Feb 2023 with normal LVEF 60%  Stress test Feb 2023 with gabby-infarct ischemia. She was started on carvedilol and amlodipine.  [facility] April 2023 with angiographically near normal coronaries         Scribe Attestation  By signing my name below, I, Gabriella Beckford   attest that this documentation has been prepared under the direction and in the presence of Alexandr Lam MD.

## 2025-03-17 ENCOUNTER — TELEPHONE (OUTPATIENT)
Dept: CARDIOLOGY | Facility: HOSPITAL | Age: 70
End: 2025-03-17
Payer: COMMERCIAL

## 2025-03-17 ENCOUNTER — APPOINTMENT (OUTPATIENT)
Dept: CARDIOLOGY | Facility: HOSPITAL | Age: 70
End: 2025-03-17
Payer: COMMERCIAL

## 2025-03-17 NOTE — TELEPHONE ENCOUNTER
PT called and has the Flu - cancelled her appointment and stated she will call back when she sees her primary care doctor

## 2025-03-19 ENCOUNTER — OFFICE VISIT (OUTPATIENT)
Dept: PRIMARY CARE | Facility: CLINIC | Age: 70
End: 2025-03-19
Payer: COMMERCIAL

## 2025-03-19 VITALS
WEIGHT: 219.8 LBS | DIASTOLIC BLOOD PRESSURE: 72 MMHG | BODY MASS INDEX: 36.62 KG/M2 | HEIGHT: 65 IN | TEMPERATURE: 97.8 F | OXYGEN SATURATION: 95 % | SYSTOLIC BLOOD PRESSURE: 134 MMHG | HEART RATE: 72 BPM | RESPIRATION RATE: 16 BRPM

## 2025-03-19 DIAGNOSIS — E78.2 MIXED HYPERLIPIDEMIA: ICD-10-CM

## 2025-03-19 DIAGNOSIS — B34.9 VIRAL ILLNESS: Primary | ICD-10-CM

## 2025-03-19 DIAGNOSIS — I47.10 PAROXYSMAL SUPRAVENTRICULAR TACHYCARDIA (CMS-HCC): ICD-10-CM

## 2025-03-19 DIAGNOSIS — M51.379 DEGENERATION OF INTERVERTEBRAL DISC OF LUMBOSACRAL REGION, UNSPECIFIED WHETHER PAIN PRESENT: ICD-10-CM

## 2025-03-19 DIAGNOSIS — J06.9 UPPER RESPIRATORY TRACT INFECTION, UNSPECIFIED TYPE: ICD-10-CM

## 2025-03-19 DIAGNOSIS — I10 HYPERTENSION, UNSPECIFIED TYPE: ICD-10-CM

## 2025-03-19 LAB
POC RAPID INFLUENZA A: NEGATIVE
POC RAPID INFLUENZA B: NEGATIVE
POC RAPID STREP: NEGATIVE
POC SARS-COV-2 AG BINAX: NORMAL

## 2025-03-19 PROCEDURE — 87880 STREP A ASSAY W/OPTIC: CPT | Performed by: INTERNAL MEDICINE

## 2025-03-19 PROCEDURE — 3075F SYST BP GE 130 - 139MM HG: CPT | Performed by: INTERNAL MEDICINE

## 2025-03-19 PROCEDURE — 99214 OFFICE O/P EST MOD 30 MIN: CPT | Performed by: INTERNAL MEDICINE

## 2025-03-19 PROCEDURE — 1036F TOBACCO NON-USER: CPT | Performed by: INTERNAL MEDICINE

## 2025-03-19 PROCEDURE — 3078F DIAST BP <80 MM HG: CPT | Performed by: INTERNAL MEDICINE

## 2025-03-19 PROCEDURE — 87804 INFLUENZA ASSAY W/OPTIC: CPT | Performed by: INTERNAL MEDICINE

## 2025-03-19 PROCEDURE — 1159F MED LIST DOCD IN RCRD: CPT | Performed by: INTERNAL MEDICINE

## 2025-03-19 PROCEDURE — 3008F BODY MASS INDEX DOCD: CPT | Performed by: INTERNAL MEDICINE

## 2025-03-19 PROCEDURE — 87811 SARS-COV-2 COVID19 W/OPTIC: CPT | Performed by: INTERNAL MEDICINE

## 2025-03-19 RX ORDER — ATORVASTATIN CALCIUM 80 MG/1
80 TABLET, FILM COATED ORAL NIGHTLY
Qty: 90 TABLET | Refills: 0 | Status: SHIPPED | OUTPATIENT
Start: 2025-03-19

## 2025-03-19 RX ORDER — MELOXICAM 7.5 MG/1
7.5 TABLET ORAL DAILY
Qty: 90 TABLET | Refills: 1 | Status: SHIPPED | OUTPATIENT
Start: 2025-03-19

## 2025-03-19 RX ORDER — CARVEDILOL 25 MG/1
25 TABLET ORAL
Qty: 180 TABLET | Refills: 3 | Status: SHIPPED | OUTPATIENT
Start: 2025-03-19 | End: 2026-03-19

## 2025-03-19 RX ORDER — AZELASTINE 1 MG/ML
1 SPRAY, METERED NASAL 2 TIMES DAILY
Qty: 30 ML | Refills: 1 | Status: SHIPPED | OUTPATIENT
Start: 2025-03-19

## 2025-03-19 RX ORDER — AMLODIPINE BESYLATE 5 MG/1
5 TABLET ORAL DAILY
Qty: 90 TABLET | Refills: 0 | Status: SHIPPED | OUTPATIENT
Start: 2025-03-19

## 2025-03-19 RX ORDER — AZITHROMYCIN 250 MG/1
TABLET, FILM COATED ORAL
Qty: 6 TABLET | Refills: 0 | Status: SHIPPED | OUTPATIENT
Start: 2025-03-19 | End: 2025-03-24

## 2025-03-19 NOTE — PROGRESS NOTES
Chief Complaint:   Routine followup , and followup for left shoulder , arm pain    HPI:  She [place]  [place] by self and her brother lives one floor below.  She has chronic pain , multiple medical issues and has ileostomy.   She uses cane , and able to do ADLs. No recent fall , injury  She has bilateral LE tingling , numbness and feeling of burning over feet soles, more so at night.    She has pain and reduced movement of left arm.She will start PT soon  MRI of L S spine reviewed with her , she has multilevel spondylosis and varying degree  of central canal stenosis / neural foraminal narrowing.  Surgery was postponed for L S Spine, per Dr Izaguirre and she had heart cath per Dr Chung.  Leg swelling has resolved now. She will get L S spine surgery later on.  She goes to Pain management and gets Belbuca film and oxycodone , OARRS reviewed  She uses [employer] / [employer] transportation service , uses cane        Review of Systems   Constitutional:  Negative for activity change, fatigue and fever.        Up with a walker , assistance   HENT:  Positive  for congestion , sorethroat , cough   Eyes:  Negative for redness and visual disturbance.   Respiratory:  Negative for  shortness of breath and wheezing.    Cardiovascular:  Negative for chest pain, palpitations and leg swelling.   Gastrointestinal:  Negative for abdominal pain, blood in stool and constipation. C/o nausea  Endocrine: Negative.    Genitourinary:  Negative for dysuria, frequency and urgency.        +OAB   Musculoskeletal:  Positive for arthralgias, back pain and gait problem. Negative for joint swelling.   She has right big toe callus  Skin:  Rash under breasts , itching and in groin area  Allergic/Immunologic: Negative.    Neurological:  Positive for weakness. Negative for dizziness, numbness and headaches.   Hematological:  Negative for adenopathy.   Psychiatric/Behavioral:  Negative for agitation and behavioral problems.       Flowsheets   3/19/2025    1:38 PM     "  /72   BP Location Left arm   Patient Position Sitting   BP Cuff Size Adult   Heart Rate 72   Temp 36.6 °C (97.8 °F)   Temp Source Temporal   Weight 99.7 kg (219 lb 12.8 oz)   Height 1.651 m (5' 5\")   Resp 16   SpO2 95 %         Tobacco     Smoking status: Never   Smokeless status: Never   Reviewed: 3/19/2025   Other Vitals     BMI: 36.58 kg/m2   BSA: 2.14 m2            Physical Exam  Constitutional:       Appearance: Normal appearance.   HENT:      Nose: No congestion.      Mouth/Throat:      Mouth: Mucous membranes are moist.      Pharynx: Oropharynx is clear.   Eyes:      Conjunctiva/sclera: Conjunctivae normal.   Cardiovascular:      Rate and Rhythm: Normal rate and regular rhythm.      Pulses: Normal pulses.      Heart sounds: Normal heart sounds. No murmur heard.  Pulmonary:      Effort: Pulmonary effort is normal.      Breath sounds: Normal breath sounds. No wheezing or rales.      Dorsal kyphosis  Abdominal:      General: Abdomen is flat. Bowel sounds are normal.      Palpations: Abdomen is soft.      Hernia: No hernia is present.      Comments: Ileostomy site is ok   Musculoskeletal:         Left shoulder : reduced ROM, and induces pain      Cervical back: Normal range of motion and neck supple.      Right lower leg: No edema.      Left lower leg: No edema.      Right  big toe Callus  Skin:     General: Skin is warm and dry. Small lipoma over right upper back     Capillary Refill: Capillary refill takes less than 2 seconds.      Findings: Pinkish fungal rash in right lateral fold     Comments: Lumbar surgical scar well healed   Neurological:      General: No focal deficit present.      Mental Status: She is alert and oriented to person, place, and time.   Psychiatric:         Mood and Affect: Mood normal.         Behavior: Behavior normal.         Assessment/Plan     Left shoulder / arm pain:  Rest , Ice or heat , analgesics, voltaren gel, lidocaine patches  Xray of left shoulder shows AC joint " arthritis  Stretching exercise, PT soon    Lumbar spine DJD:  S/p surgery per Dr Izaguirre   Postop rehab finished at NH  Continue OT and PT , pain control  Belbuca film and Oxycodone   She goes to pain management   Meloxicam 7.5 mg daily  Up with assist, walker    OAB:  Followup with Dr Acevedo  or Issa Stephens at [place]    Fungal rash :  Ketoconazole  for rash under breast  Fluconazole did not help  Hygienic precautions    Hypertension:  Amlodipine 5mg daily  Carvediol 25mg bid  BILL diet  She follows with Dr Chung      Hyperlipidemia:  Statin    H/o TIA :  ASA and statin    Ileostomy status:  GI / surgery followup  No leak    Low residue diet  Bentyl prn for cramps , and zofran prn for nausea        Recent URI :  Decongestants, mucinex, Flonase   Zithromax pack         Vision care  She would wait to get flushot, Covid booster  Mammogram yearly   Labs, next visit

## 2025-04-10 ENCOUNTER — APPOINTMENT (OUTPATIENT)
Dept: PRIMARY CARE | Facility: CLINIC | Age: 70
End: 2025-04-10
Payer: COMMERCIAL

## 2025-04-10 VITALS
BODY MASS INDEX: 36.49 KG/M2 | SYSTOLIC BLOOD PRESSURE: 128 MMHG | DIASTOLIC BLOOD PRESSURE: 72 MMHG | OXYGEN SATURATION: 96 % | HEIGHT: 65 IN | HEART RATE: 73 BPM | RESPIRATION RATE: 18 BRPM | TEMPERATURE: 97.2 F | WEIGHT: 219 LBS

## 2025-04-10 DIAGNOSIS — Z12.31 ENCOUNTER FOR SCREENING MAMMOGRAM FOR MALIGNANT NEOPLASM OF BREAST: ICD-10-CM

## 2025-04-10 DIAGNOSIS — I10 HYPERTENSION, UNSPECIFIED TYPE: ICD-10-CM

## 2025-04-10 DIAGNOSIS — E78.2 MIXED HYPERLIPIDEMIA: ICD-10-CM

## 2025-04-10 DIAGNOSIS — N32.81 OAB (OVERACTIVE BLADDER): ICD-10-CM

## 2025-04-10 DIAGNOSIS — Z86.73 HISTORY OF TRANSIENT ISCHEMIC ATTACK (TIA): ICD-10-CM

## 2025-04-10 DIAGNOSIS — Z00.00 MEDICARE ANNUAL WELLNESS VISIT, SUBSEQUENT: Primary | ICD-10-CM

## 2025-04-10 PROCEDURE — 1159F MED LIST DOCD IN RCRD: CPT | Performed by: INTERNAL MEDICINE

## 2025-04-10 PROCEDURE — 99214 OFFICE O/P EST MOD 30 MIN: CPT | Performed by: INTERNAL MEDICINE

## 2025-04-10 PROCEDURE — 3074F SYST BP LT 130 MM HG: CPT | Performed by: INTERNAL MEDICINE

## 2025-04-10 PROCEDURE — 3008F BODY MASS INDEX DOCD: CPT | Performed by: INTERNAL MEDICINE

## 2025-04-10 PROCEDURE — 1036F TOBACCO NON-USER: CPT | Performed by: INTERNAL MEDICINE

## 2025-04-10 PROCEDURE — G0439 PPPS, SUBSEQ VISIT: HCPCS | Performed by: INTERNAL MEDICINE

## 2025-04-10 PROCEDURE — 3078F DIAST BP <80 MM HG: CPT | Performed by: INTERNAL MEDICINE

## 2025-04-10 PROCEDURE — 1170F FXNL STATUS ASSESSED: CPT | Performed by: INTERNAL MEDICINE

## 2025-04-10 ASSESSMENT — ACTIVITIES OF DAILY LIVING (ADL)
TAKING_MEDICATION: INDEPENDENT
DOING_HOUSEWORK: INDEPENDENT
GROCERY_SHOPPING: INDEPENDENT
MANAGING_FINANCES: INDEPENDENT
BATHING: INDEPENDENT
DRESSING: INDEPENDENT

## 2025-04-10 ASSESSMENT — PATIENT HEALTH QUESTIONNAIRE - PHQ9
SUM OF ALL RESPONSES TO PHQ9 QUESTIONS 1 AND 2: 0
1. LITTLE INTEREST OR PLEASURE IN DOING THINGS: NOT AT ALL
2. FEELING DOWN, DEPRESSED OR HOPELESS: NOT AT ALL

## 2025-04-10 NOTE — PROGRESS NOTES
Chief Complaint:   Medicare Wellness Exam/Comprehensive Problem Focused Follow Up and Physical Exam    HPI:  She lives  in [place] , by self and her brother lives one floor below.  She has chronic pain , multiple medical issues and has ileostomy.   She uses cane , and able to do ADLs. No recent fall , injury  She has bilateral LE tingling , numbness and feeling of burning over feet soles, more so at night.    She has pain and reduced movement of left arm.She will start PT soon  MRI of L S spine reviewed with her , she has multilevel spondylosis and varying degree  of central canal stenosis / neural foraminal narrowing.  Surgery was postponed for L S Spine, per Dr Izaguirre and she had heart cath per Dr Chung.  Leg swelling has resolved now. She will get L S spine surgery later on.  She goes to Pain management and gets Belbuca film and oxycodone , OARRS reviewed  She uses [employer] / [employer] transportation service , uses cane   PMH:  Immun/Inj. Record:  91301-Covid 19 Moderna Sars-Cov-2vac mRNA, LNP-S, PF, 100 mcg/ 0.5 mL 03/30/21 02/21/21  90732-Pneumovax 23 08/12/21  79477-Sbypxsl High Dose  86000-770-09 11/03/21  Medical Problems:  Anemia, Carpal tunnel syndrome on both sides, Cervical radiculopathy, Hyperlipidemia, Hypertension  Lumbar spinal stenosis - s/p lumbar epidural injectons  Peripheral neuropathy, TIA (transient ischemic attack)  Surgical Hx:  Past Surgeries - History of Cholecystectomy    History of Dental Surgery    History of Ear pressure equalization tube insertion    History of Excision Of Facial Bone    History of Excision Of Turbinate    Ileostomy    History of Myringotomy - With Ventilating Tube Insertion    History of Nose surgery    History of Sex Reassignment Surgery    2008, associated with her improvement    History of Tonsillectomy With Adenoidectomy    FH:  Family history      Family history of cardiac disorder      Family history of diabetes mellitus      Family history of malignant neoplasm      "Family history of mental disorder      Family history of Heart problem      Family history of asthm     Family history of cardiac disorder      Family history of autism      Family history of schizophrenia      Family history of Tourette disorder    SH:  Personal Habits:  Cigarette Use: Never Smoked Cigarettes.Alcohol: Denies alcohol use.Drug Use: Denies Drug Use.Daily Caffeine: Consumes on average 1 soda per day, consumes chocolate frequently.    Review of Systems   Constitutional:  Negative for activity change, and fever.        Up with a walker , assistance She does feel tired , low energy  HENT:  Negative for congestion.    Eyes:  Negative for redness and visual disturbance.   Respiratory:  Negative for cough, shortness of breath and wheezing.    Cardiovascular:  Negative for chest pain, palpitations and leg swelling.   Gastrointestinal:  Negative for abdominal pain, blood in stool and constipation.   Endocrine: Negative.    Genitourinary:  Negative for dysuria, frequency and urgency.        +OAB   Musculoskeletal:  Positive for arthralgias, back pain and gait problem. Negative for joint swelling.   Skin:  Rash under breasts , itching  Allergic/Immunologic: Negative.    Neurological:  Positive for weakness. Negative for dizziness, numbness and headaches.   Hematological:  Negative for adenopathy.   Psychiatric/Behavioral:  Negative for agitation and behavioral problems.       VITALS :  /72 (BP Location: Left arm, Patient Position: Sitting, BP Cuff Size: Adult)   Pulse 73   Temp 36.2 °C (97.2 °F) (Temporal)   Resp 18   Ht 1.651 m (5' 5\")   Wt 99.3 kg (219 lb)   SpO2 96%   BMI 36.44 kg/m²          Physical Exam  Constitutional:       Appearance: Normal appearance.   HENT:      Nose: No congestion.      Mouth/Throat:      Mouth: Mucous membranes are moist.      Pharynx: Oropharynx is clear.   Eyes:      Conjunctiva/sclera: Conjunctivae normal.   Cardiovascular:      Rate and Rhythm: Normal rate and " regular rhythm.      Pulses: Normal pulses.      Heart sounds: Normal heart sounds. No murmur heard.  Pulmonary:      Effort: Pulmonary effort is normal.      Breath sounds: Normal breath sounds. No wheezing or rales.      Dorsal kyphosis  Abdominal:      General: Abdomen is flat. Bowel sounds are normal.      Palpations: Abdomen is soft.      Hernia: No hernia is present.      Comments: Ileostomy site is ok   Musculoskeletal:         General: No swelling or tenderness. Normal range of motion.      Cervical back: Normal range of motion and neck supple.      Right lower leg: No edema.      Left lower leg: No edema.   Skin:     General: Skin is warm and dry.      Capillary Refill: Capillary refill takes less than 2 seconds.      Findings: Pinkish fungal rash under left breast     Comments: Lumbar surgical scar well healed   Neurological:      General: No focal deficit present.      Mental Status: She is alert and oriented to person, place, and time.   Psychiatric:         Mood and Affect: Mood normal.         Behavior: Behavior normal.         Assessment/Plan     Lumbar spine DJD:  S/p surgery per Dr Izaguirre   Postop rehab finished at NH  Continue OT and PT , pain control  Belbuca film and Oxycodone   She goes to pain management   Up with assist, walker    OAB:  Followup with Dr Acevedo at [place]    Fungal rash :  Nizoral cream for rash under breast  Fluconazole did not help  Hygienic precautions    Hypertension:  Amlodipine 5mg daily  Carvediol 25mg bid  BILL diet  She follows with Dr Chung  Seen by CNP on 3/11    Hyperlipidemia:  Statin    H/o TIA :  ASA and statin    Ileostomy status:  GI / surgery followup  No leak    She saw VARSHA Cole  at [facility] , had motility studies ( [facility] )  EGD and Flex sig were done   She had UGI and SBFT also per Dr Camara  Low residue diet  Bentyl prn for cramps , and zofran prn for nausea             Vision care  She would wait to get flushot, Covid booster  Mammogram yearly    Check labs      Tobacco/Alcohol/Opioid use, as well as Illicit Drug Use was screened for/reviewed and documented in Social Documentation section of the chart and medication list as appropriate    Allergies and Medications  Bee venom protein (honey bee), Latex, Morphine, Pregabalin, Acetaminophen, Cephalexin, Eggplant, Gabapentin, Ibuprofen, Iodine-131, Shellfish containing products, Topiramate, and Tramadol  Current Outpatient Medications   Medication Instructions    albuterol 90 mcg/actuation inhaler INHALE TWO PUFFS BY MOUTH EVERY 4 HOURS AS NEEDED FOR WHEEZING, SHORTNESS OF BREATH, OR cough (USE WITH spacer)    amLODIPine (NORVASC) 5 mg, oral, Daily, as directed    atorvastatin (LIPITOR) 80 mg, oral, Nightly    azelastine (Astelin) 137 mcg (0.1 %) nasal spray 1 spray, Each Nostril, 2 times daily    Belbuca 750 mcg buccal film PLACE ONE FILM TO THE GUMS BUCALLY EVERY TWELVE HOURS    Breo Ellipta 200-25 mcg/dose inhaler INHALE 1 PUFF BY MOUTH INTO THE LUNGS EVERY DAY    budesonide-formoteroL (Symbicort) 160-4.5 mcg/actuation inhaler 2 puffs    carvedilol (COREG) 25 mg, oral, 2 times daily (morning and late afternoon)    cyclobenzaprine (Flexeril) 10 mg tablet TAKE 1/2 TO 1 TABLET BY MOUTH TWICE DAILY AS NEEDED FOR 30 DAYS    dicyclomine (Bentyl) 10 mg capsule TAKE ONE CAPSULE BY MOUTH FOUR TIMES DAILY AS NEEDED FOR ABDOMINAL cramping OR DIARRHEA  Strength: 10 mg    EPINEPHrine (EPIPEN) 0.3 mg    fluticasone (Flonase) 50 mcg/actuation nasal spray 2 sprays, Daily, Shake liquid prior to use.    fluticasone propion-salmeteroL (Advair Diskus) 250-50 mcg/dose diskus inhaler 1 puff    ketoconazole (NIZOral) 2 % cream Topical, Daily    loratadine (Claritin) 10 mg tablet 1 tablet, Daily    meloxicam (MOBIC) 7.5 mg, oral, Daily    naloxone (Narcan) 4 mg/0.1 mL nasal spray as directed Nasally as directed for 1 day    omeprazole (PriLOSEC) 20 mg DR capsule TAKE ONE CAPSULE BY MOUTH EVERY MORNING (TAKE BEFORE MEALS)     ondansetron (ZOFRAN) 4 mg, oral, Every 12 hours PRN    oxyCODONE (Roxicodone) 5 mg immediate release tablet Take 1 tablet by mouth every four to six hours while awake as needed for pain    tamsulosin (FLOMAX) 0.4 mg, oral, Daily     Medications and Supplements  prescribed by me and other practitioners or clinical pharmacist (such as prescriptions, OTC's, herbal therapies and supplements) were reviewed and documented in the medical record.      Activities of Daily Living  In your present state of health, do you have any difficulty performing the following activities?:   Preparing food and eating?: Yes  Bathing yourself: Yes  Getting dressed: Yes  Using the toilet:Yes  Moving around from place to place: Yes  In the past year have you fallen or had a near fall?:No  Able to manage finances independently: Yes  Able to perform grocery shopping: Yes  Able to manage medications independently: Yes  Able to do housework independently: Yes  Patient self-assessment of health status? Fair    Depression Screen  Depression screening (15m) completed using the PHQ-2 questions with results documented in the chart/encounter  (See note and/or Rooming Screenings for documentation)    Current exercise habits: limited mobility   Dietary issues discussed: Yes  Hearing difficulties: No  Safe in current home environment: Yes  Visual Acuity assessed: No  Cognitive Impairment No    Advance directives  Advanced Care Planning (including a Living Will, Healthcare POA, as well as specific end of life choices and/or directives), was discussed (~16 min) with the patient and/or surrogate, voluntarily, and details of that discussion documented in the Problem List (under Advanced Directives Discussion) of the medical record.

## 2025-04-24 ENCOUNTER — APPOINTMENT (OUTPATIENT)
Dept: CARDIOLOGY | Facility: HOSPITAL | Age: 70
End: 2025-04-24
Payer: COMMERCIAL

## 2025-04-24 NOTE — PROGRESS NOTES
"Counseling:  The patient was counseled regarding diagnostic results, instructions for management, risk factor reductions, prognosis, patient and family education, impressions, risks and benefits of treatment options and importance of compliance with treatment.       Chief Complaint:   The patient presents today for 8-month followup of HTN and SVT.      History Of Present Illness:    Verónica Silva is a 69 y.o. adult patient whose PMH is significant for hyperlipidemia, HTN, SVT, GERD, pernicious anemia, Sjogren's syndrome, TIA, asthma, and narcolepsy. She is a former patient of Dr. Chung and presents today for 8-month followup of HTN and SVT. Today, the patient states that she is feeling unwell, reporting a 3-week history of BLE edema. She states that she had a lower extremity ultrasound performed this morning that was negative for DVT. She also reports chest pain, alternating between squeezing and pressure, progressively worsening exertional SOB, and fatigue. BP has been stable. The patient is compliant with her prescribed medications.      Last Recorded Vitals:  Vitals:    05/13/25 1410   BP: 144/80   BP Location: Left arm   Patient Position: Sitting   BP Cuff Size: Adult   Pulse: 66   SpO2: 99%   Weight: 100 kg (221 lb)   Height: 1.626 m (5' 4\")       Past Surgical History:  She has a past surgical history that includes Other surgical history (06/28/2019); Other surgical history (06/28/2019); Ileostomy (03/18/2014); Other surgical history (03/18/2014); Other surgical history (03/18/2014); Tonsillectomy (03/18/2014); Myringotomy w/ tubes (03/18/2014); Other surgical history (03/18/2014); Cholecystectomy (03/18/2014); and Other surgical history (03/18/2014).      Social History:  She reports that she has never smoked. She has never been exposed to tobacco smoke. She has never used smokeless tobacco. She reports that she does not drink alcohol and does not use drugs.    Family History:  Family History[1]   "   Allergies:  Bee venom protein (honey bee), Latex, Morphine, Pregabalin, Acetaminophen, Cephalexin, Eggplant, Gabapentin, Ibuprofen, Iodine-131, Shellfish containing products, Topiramate, and Tramadol    Outpatient Medications:  Current Outpatient Medications   Medication Instructions    albuterol 90 mcg/actuation inhaler INHALE TWO PUFFS BY MOUTH EVERY 4 HOURS AS NEEDED FOR WHEEZING, SHORTNESS OF BREATH, OR cough (USE WITH spacer)    amLODIPine (NORVASC) 5 mg, oral, Daily, as directed    atorvastatin (LIPITOR) 80 mg, oral, Nightly    azelastine (Astelin) 137 mcg (0.1 %) nasal spray 1 spray, Each Nostril, 2 times daily    Belbuca 750 mcg buccal film PLACE ONE FILM TO THE GUMS BUCALLY EVERY TWELVE HOURS    Breo Ellipta 200-25 mcg/dose inhaler INHALE 1 PUFF BY MOUTH INTO THE LUNGS EVERY DAY    budesonide-formoteroL (Symbicort) 160-4.5 mcg/actuation inhaler 2 puffs    carvedilol (COREG) 25 mg, oral, 2 times daily (morning and late afternoon)    cyclobenzaprine (Flexeril) 10 mg tablet TAKE 1/2 TO 1 TABLET BY MOUTH TWICE DAILY AS NEEDED FOR 30 DAYS    dicyclomine (Bentyl) 10 mg capsule TAKE ONE CAPSULE BY MOUTH FOUR TIMES DAILY AS NEEDED FOR ABDOMINAL cramping OR DIARRHEA  Strength: 10 mg    EPINEPHrine (EPIPEN) 0.3 mg    fluticasone (Flonase) 50 mcg/actuation nasal spray 2 sprays, Daily, Shake liquid prior to use.    fluticasone propion-salmeteroL (Advair Diskus) 250-50 mcg/dose diskus inhaler 1 puff    ketoconazole (NIZOral) 2 % cream Topical, Daily    loratadine (Claritin) 10 mg tablet 1 tablet, Daily    meloxicam (MOBIC) 7.5 mg, oral, Daily    naloxone (Narcan) 4 mg/0.1 mL nasal spray as directed Nasally as directed for 1 day    omeprazole (PriLOSEC) 20 mg DR capsule TAKE ONE CAPSULE BY MOUTH IN THE MORNING BEFORE MEALS.    ondansetron (Zofran) 4 mg tablet TAKE ONE TABLET BY MOUTH EVERY 12 HOURS AS NEEDED FOR NAUSEA AND VOMITING    oxyCODONE (Roxicodone) 5 mg immediate release tablet Take 1 tablet by mouth every  four to six hours while awake as needed for pain    tamsulosin (FLOMAX) 0.4 mg, oral, Daily     Review of Systems   Constitutional: Positive for malaise/fatigue.   Cardiovascular:  Positive for chest pain, dyspnea on exertion and leg swelling.   All other systems reviewed and are negative.     Physical Exam:  Constitutional:       Appearance: Healthy appearance. Not in distress.   Neck:      Vascular: No JVR. JVD normal.   Pulmonary:      Effort: Pulmonary effort is normal.      Breath sounds: Normal breath sounds. No wheezing. No rhonchi. No rales.   Chest:      Chest wall: Not tender to palpatation.   Cardiovascular:      PMI at left midclavicular line. Normal rate. Regular rhythm. Normal S1. Normal S2.       Murmurs: There is no murmur.      No gallop.  No click. No rub.   Pulses:     Intact distal pulses.   Edema:     Peripheral edema absent.   Abdominal:      General: Bowel sounds are normal.      Palpations: Abdomen is soft.      Tenderness: There is no abdominal tenderness.   Musculoskeletal: Normal range of motion.         General: No tenderness. Skin:     General: Skin is warm and dry.   Neurological:      General: No focal deficit present.      Mental Status: Alert and oriented to person, place and time.          Last Labs:  CBC -  Lab Results   Component Value Date    WBC 6.1 04/21/2023    HGB 13.6 04/21/2023    HCT 41.0 04/21/2023    MCV 87 04/21/2023     04/21/2023       CMP -  Lab Results   Component Value Date    CALCIUM 8.8 04/21/2023    PROT 6.3 (L) 03/16/2021    ALBUMIN 3.9 03/16/2021    AST 17 03/16/2021    ALT 15 03/16/2021    ALKPHOS 143 (H) 03/16/2021    BILITOT 0.6 03/16/2021       LIPID PANEL -   Lab Results   Component Value Date    CHOL 120 03/16/2021    TRIG 103 03/16/2021    HDL 40.2 03/16/2021    CHHDL 3.0 03/16/2021    LDLF 59 03/16/2021    VLDL 21 03/16/2021       RENAL FUNCTION PANEL -   Lab Results   Component Value Date    GLUCOSE 128 (H) 04/21/2023     04/21/2023    K  3.7 04/21/2023     (H) 04/21/2023    CO2 23 04/21/2023    ANIONGAP 11 04/21/2023    BUN 15 04/21/2023    CREATININE 0.75 04/21/2023    CALCIUM 8.8 04/21/2023    ALBUMIN 3.9 03/16/2021        Lab Results   Component Value Date    HGBA1C 6.0 03/16/2021       Last Cardiology Tests:  04/25/2023 - Cardiac Catheterization (LH)  1. Angiographically near normal cors.  2. Mild LV-AO peak to peak pulback gradient (disregard reported numbers, falsely elevated LV systolic pressure due to catheter whip).  3. Left Ventricular end-diastolic pressure = 8.     03/07/2023 - TTE  Left ventricular systolic function is normal with a 60% estimated ejection fraction.      03/07/2023 - Stress Test  1. There is a small sized, partially reversible perfusion defect in the distal anterior and apical walls suspicious for prior infarct and minimal gabby-infarct ischemia. Calculated ejection fraction is 63% without segmental wall motion  abnormality seen.  2. Normal Stress Test. No clinical or electrocardiographic evidence for ischemia at maximal infusion.        04/03/2019 - TTE  1. Mild concentric left ventricular hypertrophy.  2. Left ventricular systolic function is normal with LVEF of 69%.  3. Trace  mitral and tricuspid regurgitation.  4. The right      Lab review: I have personally reviewed the laboratory result(s).    Assessment/Plan   1) HTN  Stable  On carvedilol 25 mg BID, amlodipine 5 mg daily  Amlodipine 10 mg caused LE edema   Continue current medical Rx      2) Palpitations; Paroxysmal SVT  On carvedilol 25 mg BID  14-day Holter March/April 2023 with brief episodes of pSVT, average HR 78 bpm; no a-fib  Continue current medical Rx      3) Chest Pain with Abnormal Stress Test - Now with Recurrent CP, SOB and LE Edema  On ASA 81 mg daily, amlodipine 5 mg daily, carvedilol 25 mg BID  TTE Feb 2023 with normal LVEF 60%  Stress test Feb 2023 with gabby-infarct ischemia. She was started on carvedilol and amlodipine.  [facility] April 2023  with angiographically near normal coronaries  Reports 3-week h/o BLE edema  Per the patient, LE U/S performed this morning was negative for DVT  Reports CP that alternates between squeezing and pressure  Reports progressively worsening exertional SOB  Reports fatigue   Labs 04/30/2025 with stable CBC and CMP, Lipids with total cholesterol and LDL of 81 and 12 respectively, slightly abnormal TSH.  Start furosemide 20 mg daily   Continue all other medications as prescribed  Check echo  Check Lexiscan Cardiolite stress test - unable to tolerate treadmill stress s/t poor functional capacity and symptoms.  F/U after testing           Scribe Attestation  By signing my name below, I, Gabriella Beckford, attest that this documentation has been prepared under the direction and in the presence of Alexandr Lam MD.        [1] No family history on file.

## 2025-05-05 ENCOUNTER — TELEPHONE (OUTPATIENT)
Dept: PRIMARY CARE | Facility: CLINIC | Age: 70
End: 2025-05-05

## 2025-05-05 DIAGNOSIS — K29.60 REFLUX GASTRITIS: ICD-10-CM

## 2025-05-05 DIAGNOSIS — R11.2 NAUSEA AND VOMITING, UNSPECIFIED VOMITING TYPE: ICD-10-CM

## 2025-05-05 RX ORDER — ONDANSETRON 4 MG/1
TABLET, FILM COATED ORAL
Qty: 60 TABLET | Refills: 0 | Status: SHIPPED | OUTPATIENT
Start: 2025-05-05 | End: 2025-06-06

## 2025-05-05 RX ORDER — OMEPRAZOLE 20 MG/1
CAPSULE, DELAYED RELEASE ORAL
Qty: 30 CAPSULE | Refills: 0 | Status: SHIPPED | OUTPATIENT
Start: 2025-05-05 | End: 2025-06-06

## 2025-05-13 ENCOUNTER — OFFICE VISIT (OUTPATIENT)
Dept: CARDIOLOGY | Facility: HOSPITAL | Age: 70
End: 2025-05-13
Payer: COMMERCIAL

## 2025-05-13 VITALS
OXYGEN SATURATION: 99 % | BODY MASS INDEX: 37.73 KG/M2 | SYSTOLIC BLOOD PRESSURE: 144 MMHG | HEART RATE: 66 BPM | WEIGHT: 221 LBS | HEIGHT: 64 IN | DIASTOLIC BLOOD PRESSURE: 80 MMHG

## 2025-05-13 DIAGNOSIS — I20.89 OTHER FORMS OF ANGINA PECTORIS: ICD-10-CM

## 2025-05-13 DIAGNOSIS — I10 PRIMARY HYPERTENSION: Primary | ICD-10-CM

## 2025-05-13 DIAGNOSIS — M79.89 LEG SWELLING: ICD-10-CM

## 2025-05-13 PROCEDURE — 99214 OFFICE O/P EST MOD 30 MIN: CPT | Performed by: INTERNAL MEDICINE

## 2025-05-13 PROCEDURE — 1160F RVW MEDS BY RX/DR IN RCRD: CPT | Performed by: INTERNAL MEDICINE

## 2025-05-13 PROCEDURE — 1036F TOBACCO NON-USER: CPT | Performed by: INTERNAL MEDICINE

## 2025-05-13 PROCEDURE — 1159F MED LIST DOCD IN RCRD: CPT | Performed by: INTERNAL MEDICINE

## 2025-05-13 PROCEDURE — 3079F DIAST BP 80-89 MM HG: CPT | Performed by: INTERNAL MEDICINE

## 2025-05-13 PROCEDURE — 3077F SYST BP >= 140 MM HG: CPT | Performed by: INTERNAL MEDICINE

## 2025-05-13 PROCEDURE — 3008F BODY MASS INDEX DOCD: CPT | Performed by: INTERNAL MEDICINE

## 2025-05-13 RX ORDER — AMINOPHYLLINE 25 MG/ML
125 INJECTION, SOLUTION INTRAVENOUS ONCE AS NEEDED
OUTPATIENT
Start: 2025-05-13

## 2025-05-13 RX ORDER — FUROSEMIDE 20 MG/1
20 TABLET ORAL DAILY
Qty: 30 TABLET | Refills: 11 | Status: SHIPPED | OUTPATIENT
Start: 2025-05-13 | End: 2026-05-13

## 2025-05-13 RX ORDER — REGADENOSON 0.08 MG/ML
0.4 INJECTION, SOLUTION INTRAVENOUS
OUTPATIENT
Start: 2025-05-13

## 2025-05-13 ASSESSMENT — ENCOUNTER SYMPTOMS: DYSPNEA ON EXERTION: 1

## 2025-05-13 NOTE — PATIENT INSTRUCTIONS
Start furosemide (Lasix) 20 mg daily. A prescription has been sent to your pharmacy.    Continue all other medications as prescribed.  Dr. Lam has ordered an echocardiogram (ultrasound of the heart) to evaluate your heart function and structure.   Dr. Lam has also ordered a stress test to ensure your heart is getting adequate blood flow and there is no evidence of any blockages within the heart arteries.    Followup with Dr. Lam after the above tests.    If you have any questions or cardiac concerns, please call our office at [phone].

## 2025-05-13 NOTE — LETTER
"May 13, 2025     Jazzmine Catalan MD  [address] [facility] [address]  [place] [address]    Patient: Verónica Silva   YOB: 1955   Date of Visit: 5/13/2025       Dear Dr. Jazzmine Catalan MD:    Thank you for referring Verónica Silva to me for evaluation. Below are my notes for this consultation.  If you have questions, please do not hesitate to call me. I look forward to following your patient along with you.       Sincerely,     Alexandr Lam MD      CC: No Recipients  ______________________________________________________________________________________    Counseling:  The patient was counseled regarding diagnostic results, instructions for management, risk factor reductions, prognosis, patient and family education, impressions, risks and benefits of treatment options and importance of compliance with treatment.       Chief Complaint:   The patient presents today for 8-month followup of HTN and SVT.      History Of Present Illness:    Verónica Silva is a 69 y.o. adult patient whose PMH is significant for hyperlipidemia, HTN, SVT, GERD, pernicious anemia, Sjogren's syndrome, TIA, asthma, and narcolepsy. She is a former patient of Dr. Chung and presents today for 8-month followup of HTN and SVT. Today, the patient states that she is feeling unwell, reporting a 3-week history of BLE edema. She states that she had a lower extremity ultrasound performed this morning that was negative for DVT. She also reports chest pain, alternating between squeezing and pressure, progressively worsening exertional SOB, and fatigue. BP has been stable. The patient is compliant with her prescribed medications.      Last Recorded Vitals:  Vitals:    05/13/25 1410   BP: 144/80   BP Location: Left arm   Patient Position: Sitting   BP Cuff Size: Adult   Pulse: 66   SpO2: 99%   Weight: 100 kg (221 lb)   Height: 1.626 m (5' 4\")       Past Surgical History:  She has a past surgical history that includes Other surgical history " (06/28/2019); Other surgical history (06/28/2019); Ileostomy (03/18/2014); Other surgical history (03/18/2014); Other surgical history (03/18/2014); Tonsillectomy (03/18/2014); Myringotomy w/ tubes (03/18/2014); Other surgical history (03/18/2014); Cholecystectomy (03/18/2014); and Other surgical history (03/18/2014).      Social History:  She reports that she has never smoked. She has never been exposed to tobacco smoke. She has never used smokeless tobacco. She reports that she does not drink alcohol and does not use drugs.    Family History:  Family History[1]     Allergies:  Bee venom protein (honey bee), Latex, Morphine, Pregabalin, Acetaminophen, Cephalexin, Eggplant, Gabapentin, Ibuprofen, Iodine-131, Shellfish containing products, Topiramate, and Tramadol    Outpatient Medications:  Current Outpatient Medications   Medication Instructions   • albuterol 90 mcg/actuation inhaler INHALE TWO PUFFS BY MOUTH EVERY 4 HOURS AS NEEDED FOR WHEEZING, SHORTNESS OF BREATH, OR cough (USE WITH spacer)   • amLODIPine (NORVASC) 5 mg, oral, Daily, as directed   • atorvastatin (LIPITOR) 80 mg, oral, Nightly   • azelastine (Astelin) 137 mcg (0.1 %) nasal spray 1 spray, Each Nostril, 2 times daily   • Belbuca 750 mcg buccal film PLACE ONE FILM TO THE GUMS BUCALLY EVERY TWELVE HOURS   • Breo Ellipta 200-25 mcg/dose inhaler INHALE 1 PUFF BY MOUTH INTO THE LUNGS EVERY DAY   • budesonide-formoteroL (Symbicort) 160-4.5 mcg/actuation inhaler 2 puffs   • carvedilol (COREG) 25 mg, oral, 2 times daily (morning and late afternoon)   • cyclobenzaprine (Flexeril) 10 mg tablet TAKE 1/2 TO 1 TABLET BY MOUTH TWICE DAILY AS NEEDED FOR 30 DAYS   • dicyclomine (Bentyl) 10 mg capsule TAKE ONE CAPSULE BY MOUTH FOUR TIMES DAILY AS NEEDED FOR ABDOMINAL cramping OR DIARRHEA  Strength: 10 mg   • EPINEPHrine (EPIPEN) 0.3 mg   • fluticasone (Flonase) 50 mcg/actuation nasal spray 2 sprays, Daily, Shake liquid prior to use.   • fluticasone  propion-salmeteroL (Advair Diskus) 250-50 mcg/dose diskus inhaler 1 puff   • ketoconazole (NIZOral) 2 % cream Topical, Daily   • loratadine (Claritin) 10 mg tablet 1 tablet, Daily   • meloxicam (MOBIC) 7.5 mg, oral, Daily   • naloxone (Narcan) 4 mg/0.1 mL nasal spray as directed Nasally as directed for 1 day   • omeprazole (PriLOSEC) 20 mg DR capsule TAKE ONE CAPSULE BY MOUTH IN THE MORNING BEFORE MEALS.   • ondansetron (Zofran) 4 mg tablet TAKE ONE TABLET BY MOUTH EVERY 12 HOURS AS NEEDED FOR NAUSEA AND VOMITING   • oxyCODONE (Roxicodone) 5 mg immediate release tablet Take 1 tablet by mouth every four to six hours while awake as needed for pain   • tamsulosin (FLOMAX) 0.4 mg, oral, Daily     Review of Systems   Constitutional: Positive for malaise/fatigue.   Cardiovascular:  Positive for chest pain, dyspnea on exertion and leg swelling.   All other systems reviewed and are negative.     Physical Exam:  Constitutional:       Appearance: Healthy appearance. Not in distress.   Neck:      Vascular: No JVR. JVD normal.   Pulmonary:      Effort: Pulmonary effort is normal.      Breath sounds: Normal breath sounds. No wheezing. No rhonchi. No rales.   Chest:      Chest wall: Not tender to palpatation.   Cardiovascular:      PMI at left midclavicular line. Normal rate. Regular rhythm. Normal S1. Normal S2.       Murmurs: There is no murmur.      No gallop.  No click. No rub.   Pulses:     Intact distal pulses.   Edema:     Peripheral edema absent.   Abdominal:      General: Bowel sounds are normal.      Palpations: Abdomen is soft.      Tenderness: There is no abdominal tenderness.   Musculoskeletal: Normal range of motion.         General: No tenderness. Skin:     General: Skin is warm and dry.   Neurological:      General: No focal deficit present.      Mental Status: Alert and oriented to person, place and time.          Last Labs:  CBC -  Lab Results   Component Value Date    WBC 6.1 04/21/2023    HGB 13.6 04/21/2023     HCT 41.0 04/21/2023    MCV 87 04/21/2023     04/21/2023       CMP -  Lab Results   Component Value Date    CALCIUM 8.8 04/21/2023    PROT 6.3 (L) 03/16/2021    ALBUMIN 3.9 03/16/2021    AST 17 03/16/2021    ALT 15 03/16/2021    ALKPHOS 143 (H) 03/16/2021    BILITOT 0.6 03/16/2021       LIPID PANEL -   Lab Results   Component Value Date    CHOL 120 03/16/2021    TRIG 103 03/16/2021    HDL 40.2 03/16/2021    CHHDL 3.0 03/16/2021    LDLF 59 03/16/2021    VLDL 21 03/16/2021       RENAL FUNCTION PANEL -   Lab Results   Component Value Date    GLUCOSE 128 (H) 04/21/2023     04/21/2023    K 3.7 04/21/2023     (H) 04/21/2023    CO2 23 04/21/2023    ANIONGAP 11 04/21/2023    BUN 15 04/21/2023    CREATININE 0.75 04/21/2023    CALCIUM 8.8 04/21/2023    ALBUMIN 3.9 03/16/2021        Lab Results   Component Value Date    HGBA1C 6.0 03/16/2021       Last Cardiology Tests:  04/25/2023 - Cardiac Catheterization (LH)  1. Angiographically near normal cors.  2. Mild LV-AO peak to peak pulback gradient (disregard reported numbers, falsely elevated LV systolic pressure due to catheter whip).  3. Left Ventricular end-diastolic pressure = 8.     03/07/2023 - TTE  Left ventricular systolic function is normal with a 60% estimated ejection fraction.      03/07/2023 - Stress Test  1. There is a small sized, partially reversible perfusion defect in the distal anterior and apical walls suspicious for prior infarct and minimal gabby-infarct ischemia. Calculated ejection fraction is 63% without segmental wall motion  abnormality seen.  2. Normal Stress Test. No clinical or electrocardiographic evidence for ischemia at maximal infusion.        04/03/2019 - TTE  1. Mild concentric left ventricular hypertrophy.  2. Left ventricular systolic function is normal with LVEF of 69%.  3. Trace  mitral and tricuspid regurgitation.  4. The right      Lab review: I have personally reviewed the laboratory result(s).    Assessment/Plan  1)  HTN  Stable  On carvedilol 25 mg BID, amlodipine 5 mg daily  Amlodipine 10 mg caused LE edema   Continue current medical Rx      2) Palpitations; Paroxysmal SVT  On carvedilol 25 mg BID  14-day Holter March/April 2023 with brief episodes of pSVT, average HR 78 bpm; no a-fib  Continue current medical Rx      3) Chest Pain with Abnormal Stress Test - Now with Recurrent CP, SOB and LE Edema  On ASA 81 mg daily, amlodipine 5 mg daily, carvedilol 25 mg BID  TTE Feb 2023 with normal LVEF 60%  Stress test Feb 2023 with gabby-infarct ischemia. She was started on carvedilol and amlodipine.  LHC April 2023 with angiographically near normal coronaries  Reports 3-week h/o BLE edema  Per the patient, LE U/S performed this morning was negative for DVT  Reports CP that alternates between squeezing and pressure  Reports progressively worsening exertional SOB  Reports fatigue   Labs 04/30/2025 with stable CBC and CMP, Lipids with total cholesterol and LDL of 81 and 12 respectively, slightly abnormal TSH.  Start furosemide 20 mg daily   Continue all other medications as prescribed  Check echo  Check Lexiscan Cardiolite stress test - unable to tolerate treadmill stress s/t poor functional capacity and symptoms.  F/U after testing           Scribe Attestation  By signing my name below, I, Gabriella Beckford, attest that this documentation has been prepared under the direction and in the presence of Alexandr Lam MD.        [1]  No family history on file.       [1]  No family history on file.

## 2025-05-14 NOTE — TELEPHONE ENCOUNTER
I do not see them on your chart  posted. I would like to do complete bloodwork  including thyroid function, on your next visit , ( fasting )  Thanks

## 2025-05-14 NOTE — TELEPHONE ENCOUNTER
PATIENT LEFT [facility] ON RX LINE STATING SHE SAW HER CARDIOLOGIST YESTERDAY AND HE TOLD HER THAT HER THYROID LEVELS ARE LOW AND NEED TREATMENT AND TO SPEAK WITH YOU ABOUT THIS. PLEASE ADVISE

## 2025-06-04 ENCOUNTER — APPOINTMENT (OUTPATIENT)
Dept: RADIOLOGY | Facility: HOSPITAL | Age: 70
End: 2025-06-04
Payer: COMMERCIAL

## 2025-06-04 ENCOUNTER — APPOINTMENT (OUTPATIENT)
Dept: CARDIOLOGY | Facility: HOSPITAL | Age: 70
End: 2025-06-04
Payer: COMMERCIAL

## 2025-06-06 DIAGNOSIS — J31.0 CHRONIC RHINITIS: ICD-10-CM

## 2025-06-06 RX ORDER — FLUTICASONE PROPIONATE 50 MCG
2 SPRAY, SUSPENSION (ML) NASAL DAILY
Qty: 16 G | Refills: 3 | Status: SHIPPED | OUTPATIENT
Start: 2025-06-06

## 2025-06-13 ENCOUNTER — APPOINTMENT (OUTPATIENT)
Dept: CARDIOLOGY | Facility: HOSPITAL | Age: 70
End: 2025-06-13
Payer: COMMERCIAL

## 2025-06-24 ENCOUNTER — HOSPITAL ENCOUNTER (OUTPATIENT)
Dept: CARDIOLOGY | Facility: HOSPITAL | Age: 70
Discharge: HOME | End: 2025-06-24
Payer: COMMERCIAL

## 2025-06-24 DIAGNOSIS — I20.89 OTHER FORMS OF ANGINA PECTORIS: ICD-10-CM

## 2025-06-24 DIAGNOSIS — I10 PRIMARY HYPERTENSION: ICD-10-CM

## 2025-06-24 DIAGNOSIS — M79.89 LEG SWELLING: ICD-10-CM

## 2025-06-24 PROCEDURE — 93306 TTE W/DOPPLER COMPLETE: CPT | Performed by: INTERNAL MEDICINE

## 2025-06-24 PROCEDURE — 93306 TTE W/DOPPLER COMPLETE: CPT

## 2025-06-25 LAB
AORTIC VALVE MEAN GRADIENT: 3 MMHG
AORTIC VALVE PEAK VELOCITY: 1.02 M/S
AV PEAK GRADIENT: 4 MMHG
AVA (PEAK VEL): 3.25 CM2
AVA (VTI): 3.05 CM2
EJECTION FRACTION: 58 %
LEFT ATRIUM VOLUME AREA LENGTH INDEX BSA: 21.7 ML/M2
LEFT VENTRICLE INTERNAL DIMENSION DIASTOLE: 4.63 CM (ref 3.5–6)
LEFT VENTRICULAR OUTFLOW TRACT DIAMETER: 2.21 CM
MITRAL VALVE E/A RATIO: 0.87
MITRAL VALVE E/E' RATIO: 9.4
RIGHT VENTRICLE FREE WALL PEAK S': 15.25 CM/S
TRICUSPID ANNULAR PLANE SYSTOLIC EXCURSION: 2.3 CM

## 2025-06-26 ENCOUNTER — HOSPITAL ENCOUNTER (OUTPATIENT)
Dept: RADIOLOGY | Facility: HOSPITAL | Age: 70
Discharge: HOME | End: 2025-06-26
Payer: COMMERCIAL

## 2025-06-26 ENCOUNTER — HOSPITAL ENCOUNTER (OUTPATIENT)
Dept: CARDIOLOGY | Facility: HOSPITAL | Age: 70
Discharge: HOME | End: 2025-06-26
Payer: COMMERCIAL

## 2025-06-26 DIAGNOSIS — M79.89 LEG SWELLING: ICD-10-CM

## 2025-06-26 DIAGNOSIS — I10 PRIMARY HYPERTENSION: ICD-10-CM

## 2025-06-26 DIAGNOSIS — I20.89 OTHER FORMS OF ANGINA PECTORIS: ICD-10-CM

## 2025-06-26 PROCEDURE — 3430000001 HC RX 343 DIAGNOSTIC RADIOPHARMACEUTICALS: Performed by: INTERNAL MEDICINE

## 2025-06-26 PROCEDURE — 2500000004 HC RX 250 GENERAL PHARMACY W/ HCPCS (ALT 636 FOR OP/ED): Performed by: INTERNAL MEDICINE

## 2025-06-26 PROCEDURE — A9502 TC99M TETROFOSMIN: HCPCS | Performed by: INTERNAL MEDICINE

## 2025-06-26 PROCEDURE — 78452 HT MUSCLE IMAGE SPECT MULT: CPT

## 2025-06-26 PROCEDURE — 93016 CV STRESS TEST SUPVJ ONLY: CPT | Performed by: INTERNAL MEDICINE

## 2025-06-26 PROCEDURE — 93018 CV STRESS TEST I&R ONLY: CPT | Performed by: INTERNAL MEDICINE

## 2025-06-26 PROCEDURE — 93017 CV STRESS TEST TRACING ONLY: CPT

## 2025-06-26 RX ORDER — REGADENOSON 0.08 MG/ML
0.4 INJECTION, SOLUTION INTRAVENOUS
Status: COMPLETED | OUTPATIENT
Start: 2025-06-26 | End: 2025-06-26

## 2025-06-26 RX ADMIN — REGADENOSON 0.4 MG: 0.08 INJECTION, SOLUTION INTRAVENOUS at 14:19

## 2025-06-26 RX ADMIN — TETROFOSMIN 11.2 MILLICURIE: 0.23 INJECTION, POWDER, LYOPHILIZED, FOR SOLUTION INTRAVENOUS at 12:20

## 2025-06-26 RX ADMIN — TETROFOSMIN 30 MILLICURIE: 0.23 INJECTION, POWDER, LYOPHILIZED, FOR SOLUTION INTRAVENOUS at 14:10

## 2025-06-29 ASSESSMENT — ENCOUNTER SYMPTOMS: DYSPNEA ON EXERTION: 1

## 2025-06-29 NOTE — PROGRESS NOTES
"Counseling:  The patient was counseled regarding diagnostic results, instructions for management, risk factor reductions, prognosis, patient and family education, impressions, risks and benefits of treatment options and importance of compliance with treatment.       Chief Complaint:   The patient presents today for 6-week followup of CP, progressively worsening exertional SOB and fatigue s/p echocardiogram and stress test which were negative for any concerns. However she continues to have chest pain, alternating between squeezing and pressure, progressively worsening exertional SOB, and fatigue.  We discussed it may be related to GI concerns/acid reflux. BP has been stable. The patient is compliant with her prescribed medications.      History Of Present Illness:    Verónica Silva is a 69 y.o. adult patient who presents today for 6-week followup of CP, progressively worsening exertional SOB and fatigue s/p echocardiogram and stress test. Her PMH is significant for hyperlipidemia, HTN, SVT, GERD, pernicious anemia, Sjogren's syndrome, TIA, asthma, and narcolepsy. On 06/24/2025, echocardiogram demonstrated an EF of 55-60% and normal RV systolic function, and stress testing was negative for ischemia.     Last Recorded Vitals:  Vitals:    06/30/25 1531   BP: 140/80   BP Location: Left arm   Patient Position: Sitting   BP Cuff Size: Adult   Pulse: 67   SpO2: 96%   Weight: 95.3 kg (210 lb)   Height: 1.626 m (5' 4\")       Past Surgical History:  She has a past surgical history that includes Other surgical history (06/28/2019); Other surgical history (06/28/2019); Ileostomy (03/18/2014); Other surgical history (03/18/2014); Other surgical history (03/18/2014); Tonsillectomy (03/18/2014); Myringotomy w/ tubes (03/18/2014); Other surgical history (03/18/2014); Cholecystectomy (03/18/2014); and Other surgical history (03/18/2014).      Social History:  She reports that she has never smoked. She has never been exposed to " tobacco smoke. She has never used smokeless tobacco. She reports that she does not drink alcohol and does not use drugs.    Family History:  Family History[1]     Allergies:  Bee venom protein (honey bee), Latex, Morphine, Pregabalin, Acetaminophen, Cephalexin, Eggplant, Gabapentin, Ibuprofen, Iodine-131, Shellfish containing products, Topiramate, and Tramadol    Outpatient Medications:  Current Outpatient Medications   Medication Instructions    albuterol 90 mcg/actuation inhaler INHALE TWO PUFFS BY MOUTH EVERY 4 HOURS AS NEEDED FOR WHEEZING, SHORTNESS OF BREATH, OR cough (USE WITH spacer)    amLODIPine (NORVASC) 5 mg, oral, Daily, as directed    atorvastatin (LIPITOR) 80 mg, oral, Nightly    azelastine (Astelin) 137 mcg (0.1 %) nasal spray 1 spray, Each Nostril, 2 times daily    Belbuca 750 mcg buccal film PLACE ONE FILM TO THE GUMS BUCALLY EVERY TWELVE HOURS    Breo Ellipta 200-25 mcg/dose inhaler INHALE 1 PUFF BY MOUTH INTO THE LUNGS EVERY DAY    budesonide-formoteroL (Symbicort) 160-4.5 mcg/actuation inhaler 2 puffs    carvedilol (COREG) 25 mg, oral, 2 times daily (morning and late afternoon)    cyclobenzaprine (Flexeril) 10 mg tablet TAKE 1/2 TO 1 TABLET BY MOUTH TWICE DAILY AS NEEDED FOR 30 DAYS    dicyclomine (Bentyl) 10 mg capsule TAKE ONE CAPSULE BY MOUTH FOUR TIMES DAILY AS NEEDED FOR ABDOMINAL cramping OR DIARRHEA  Strength: 10 mg    EPINEPHrine (EPIPEN) 0.3 mg    fluticasone (Flonase) 50 mcg/actuation nasal spray 2 sprays, Daily, Shake liquid prior to use.    fluticasone propion-salmeteroL (Advair Diskus) 250-50 mcg/dose diskus inhaler 1 puff    furosemide (LASIX) 20 mg, oral, Daily    ketoconazole (NIZOral) 2 % cream Topical, Daily    loratadine (Claritin) 10 mg tablet 1 tablet, Daily    meloxicam (MOBIC) 7.5 mg, oral, Daily    naloxone (Narcan) 4 mg/0.1 mL nasal spray as directed Nasally as directed for 1 day    omeprazole (PriLOSEC) 20 mg DR capsule TAKE ONE CAPSULE BY MOUTH IN THE MORNING BEFORE  MEALS    ondansetron (Zofran) 4 mg tablet TAKE ONE TABLET BY MOUTH EVERY 12 HOURS AS NEEDED FOR NAUSEA AND VOMITING    oxyCODONE (Roxicodone) 5 mg immediate release tablet Take 1 tablet by mouth every four to six hours while awake as needed for pain    tamsulosin (FLOMAX) 0.4 mg, oral, Daily     Review of Systems   Constitutional: Positive for malaise/fatigue.   Cardiovascular:  Positive for chest pain, dyspnea on exertion and leg swelling.   All other systems reviewed and are negative.     Physical Exam:  Constitutional:       Appearance: Healthy appearance. Not in distress.   Neck:      Vascular: No JVR. JVD normal.   Pulmonary:      Effort: Pulmonary effort is normal.      Breath sounds: Normal breath sounds. No wheezing. No rhonchi. No rales.   Chest:      Chest wall: Not tender to palpatation.   Cardiovascular:      PMI at left midclavicular line. Normal rate. Regular rhythm. Normal S1. Normal S2.       Murmurs: There is no murmur.      No gallop.  No click. No rub.   Pulses:     Intact distal pulses.   Edema:     Peripheral edema absent.   Abdominal:      General: Bowel sounds are normal.      Palpations: Abdomen is soft.      Tenderness: There is no abdominal tenderness.   Musculoskeletal: Normal range of motion.         General: No tenderness. Skin:     General: Skin is warm and dry.   Neurological:      General: No focal deficit present.      Mental Status: Alert and oriented to person, place and time.          Last Labs:  CBC -  Lab Results   Component Value Date    WBC 6.1 04/21/2023    HGB 13.6 04/21/2023    HCT 41.0 04/21/2023    MCV 87 04/21/2023     04/21/2023       CMP -  Lab Results   Component Value Date    CALCIUM 8.8 04/21/2023    PROT 6.3 (L) 03/16/2021    ALBUMIN 3.9 03/16/2021    AST 17 03/16/2021    ALT 15 03/16/2021    ALKPHOS 143 (H) 03/16/2021    BILITOT 0.6 03/16/2021       LIPID PANEL -   Lab Results   Component Value Date    CHOL 120 03/16/2021    TRIG 103 03/16/2021    HDL 40.2  03/16/2021    CHHDL 3.0 03/16/2021    LDLF 59 03/16/2021    VLDL 21 03/16/2021       RENAL FUNCTION PANEL -   Lab Results   Component Value Date    GLUCOSE 128 (H) 04/21/2023     04/21/2023    K 3.7 04/21/2023     (H) 04/21/2023    CO2 23 04/21/2023    ANIONGAP 11 04/21/2023    BUN 15 04/21/2023    CREATININE 0.75 04/21/2023    CALCIUM 8.8 04/21/2023    ALBUMIN 3.9 03/16/2021        Lab Results   Component Value Date    HGBA1C 6.0 03/16/2021       Last Cardiology Tests:  06/24/2025 - Stress Test  1. Normal perfusion without evidence of ischemia. Calculated ejection fraction of 64 % without wall motion abnormalities seen.   2. No ECG changes from baseline.     06/24/2025 - TTE  1. Left ventricular ejection fraction is normal by visual estimate at 55-60%.  2. There is normal right ventricular global systolic function.    04/25/2023 - Cardiac Catheterization (LH)  1. Angiographically near normal cors.  2. Mild LV-AO peak to peak pulback gradient (disregard reported numbers, falsely elevated LV systolic pressure due to catheter whip).  3. Left Ventricular end-diastolic pressure = 8.     03/07/2023 - TTE  Left ventricular systolic function is normal with a 60% estimated ejection fraction.      03/07/2023 - Stress Test  1. There is a small sized, partially reversible perfusion defect in the distal anterior and apical walls suspicious for prior infarct and minimal gabby-infarct ischemia. Calculated ejection fraction is 63% without segmental wall motion  abnormality seen.  2. Normal Stress Test. No clinical or electrocardiographic evidence for ischemia at maximal infusion.        04/03/2019 - TTE  1. Mild concentric left ventricular hypertrophy.  2. Left ventricular systolic function is normal with LVEF of 69%.  3. Trace  mitral and tricuspid regurgitation.  4. The right      Diagnostic review: I have personally reviewed the result(s) of the Echocardiogram and Stress Test.     Assessment/Plan   1) HTN  Stable  On  carvedilol 25 mg BID, amlodipine 5 mg daily  Amlodipine 10 mg caused LE edema   Continue current medical Rx      2) Palpitations; Paroxysmal SVT  On carvedilol 25 mg BID  14-day Holter March/April 2023 with brief episodes of pSVT, average HR 78 bpm; no a-fib  Continue current medical Rx      3) Chest Pain with Abnormal Stress Test - Now with Recurrent CP, SOB and LE Edema  On ASA 81 mg daily, amlodipine 5 mg daily, carvedilol 25 mg BID, furosemide 20 mg daily   TTE Feb 2023 with normal LVEF 60%  Stress test Feb 2023 with gabby-infarct ischemia. She was started on carvedilol and amlodipine.  C April 2023 with angiographically near normal coronaries  Reports 3-week h/o BLE edema  Per the patient, LE U/S performed this morning was negative for DVT  Reports CP that alternates between squeezing and pressure  Reports progressively worsening exertional SOB  Reports fatigue   Labs 04/30/2025 with stable CBC and CMP, Lipids with total cholesterol and LDL of 81 and 12 respectively, slightly abnormal TSH.  TTE 06/24/2025 with LVEF 55-60%, normal RV systolic function  Stress test 06/24/2025 negative for ischemia      Scribe Attestation  By signing my name below, I, Gabriella Perez attest that this documentation has been prepared under the direction and in the presence of Alexandr Lam MD. All medical record entries made by the Scribe were at my direction or personally dictated by me. I have reviewed the chart and agree that the record accurately reflects my personal performance of the history, physical exam, discussion and plan.         [1] No family history on file.

## 2025-06-30 ENCOUNTER — OFFICE VISIT (OUTPATIENT)
Dept: CARDIOLOGY | Facility: HOSPITAL | Age: 70
End: 2025-06-30
Payer: COMMERCIAL

## 2025-06-30 VITALS
HEIGHT: 64 IN | BODY MASS INDEX: 35.85 KG/M2 | HEART RATE: 67 BPM | WEIGHT: 210 LBS | DIASTOLIC BLOOD PRESSURE: 80 MMHG | SYSTOLIC BLOOD PRESSURE: 140 MMHG | OXYGEN SATURATION: 96 %

## 2025-06-30 DIAGNOSIS — M79.89 LEG SWELLING: ICD-10-CM

## 2025-06-30 DIAGNOSIS — I20.89 OTHER FORMS OF ANGINA PECTORIS: ICD-10-CM

## 2025-06-30 DIAGNOSIS — I10 PRIMARY HYPERTENSION: ICD-10-CM

## 2025-06-30 PROCEDURE — 3077F SYST BP >= 140 MM HG: CPT | Performed by: INTERNAL MEDICINE

## 2025-06-30 PROCEDURE — 99212 OFFICE O/P EST SF 10 MIN: CPT | Performed by: INTERNAL MEDICINE

## 2025-06-30 PROCEDURE — 3079F DIAST BP 80-89 MM HG: CPT | Performed by: INTERNAL MEDICINE

## 2025-06-30 PROCEDURE — 1036F TOBACCO NON-USER: CPT | Performed by: INTERNAL MEDICINE

## 2025-06-30 PROCEDURE — 99213 OFFICE O/P EST LOW 20 MIN: CPT | Performed by: INTERNAL MEDICINE

## 2025-06-30 PROCEDURE — 3008F BODY MASS INDEX DOCD: CPT | Performed by: INTERNAL MEDICINE

## 2025-06-30 PROCEDURE — 1159F MED LIST DOCD IN RCRD: CPT | Performed by: INTERNAL MEDICINE

## 2025-06-30 NOTE — PATIENT INSTRUCTIONS
Continue all other medications as prescribed.  Followup with Dr. Lam in 6 months.    If you have any questions or cardiac concerns, please call our office at [phone].

## 2025-08-06 ENCOUNTER — APPOINTMENT (OUTPATIENT)
Dept: PRIMARY CARE | Facility: CLINIC | Age: 70
End: 2025-08-06
Payer: COMMERCIAL

## 2025-08-06 VITALS — OXYGEN SATURATION: 95 % | RESPIRATION RATE: 18 BRPM | HEART RATE: 74 BPM | TEMPERATURE: 97.3 F

## 2025-08-06 DIAGNOSIS — F19.21 HISTORY OF SUBSTANCE DEPENDENCE (MULTI): ICD-10-CM

## 2025-08-06 DIAGNOSIS — Z00.00 MEDICARE ANNUAL WELLNESS VISIT, SUBSEQUENT: Primary | ICD-10-CM

## 2025-08-06 DIAGNOSIS — N32.81 OAB (OVERACTIVE BLADDER): ICD-10-CM

## 2025-08-06 DIAGNOSIS — M51.379 DEGENERATION OF INTERVERTEBRAL DISC OF LUMBOSACRAL REGION, UNSPECIFIED WHETHER PAIN PRESENT: ICD-10-CM

## 2025-08-06 DIAGNOSIS — I47.10 PAROXYSMAL SUPRAVENTRICULAR TACHYCARDIA: ICD-10-CM

## 2025-08-06 DIAGNOSIS — I10 HYPERTENSION, UNSPECIFIED TYPE: ICD-10-CM

## 2025-08-06 DIAGNOSIS — I10 ESSENTIAL HYPERTENSION, BENIGN: ICD-10-CM

## 2025-08-06 DIAGNOSIS — E78.2 MIXED HYPERLIPIDEMIA: ICD-10-CM

## 2025-08-06 DIAGNOSIS — Z86.73 HISTORY OF TRANSIENT ISCHEMIC ATTACK (TIA): ICD-10-CM

## 2025-08-06 PROBLEM — G95.9 CERVICAL MYELOPATHY: Status: RESOLVED | Noted: 2024-01-30 | Resolved: 2025-08-06

## 2025-08-06 PROCEDURE — 99214 OFFICE O/P EST MOD 30 MIN: CPT | Performed by: INTERNAL MEDICINE

## 2025-08-06 PROCEDURE — 1170F FXNL STATUS ASSESSED: CPT | Performed by: INTERNAL MEDICINE

## 2025-08-06 PROCEDURE — 1036F TOBACCO NON-USER: CPT | Performed by: INTERNAL MEDICINE

## 2025-08-06 PROCEDURE — 1159F MED LIST DOCD IN RCRD: CPT | Performed by: INTERNAL MEDICINE

## 2025-08-06 RX ORDER — CARVEDILOL 25 MG/1
25 TABLET ORAL
Qty: 180 TABLET | Refills: 3 | Status: SHIPPED | OUTPATIENT
Start: 2025-08-06 | End: 2026-08-06

## 2025-08-06 RX ORDER — AMLODIPINE BESYLATE 5 MG/1
5 TABLET ORAL DAILY
Qty: 90 TABLET | Refills: 0 | Status: SHIPPED | OUTPATIENT
Start: 2025-08-06

## 2025-08-06 RX ORDER — ATORVASTATIN CALCIUM 80 MG/1
80 TABLET, FILM COATED ORAL NIGHTLY
Qty: 90 TABLET | Refills: 0 | Status: SHIPPED | OUTPATIENT
Start: 2025-08-06

## 2025-08-06 RX ORDER — MELOXICAM 7.5 MG/1
7.5 TABLET ORAL DAILY
Qty: 90 TABLET | Refills: 0 | Status: SHIPPED | OUTPATIENT
Start: 2025-08-06

## 2025-08-06 ASSESSMENT — ACTIVITIES OF DAILY LIVING (ADL)
DRESSING: INDEPENDENT
BATHING: INDEPENDENT
MANAGING_FINANCES: INDEPENDENT
TAKING_MEDICATION: INDEPENDENT
DOING_HOUSEWORK: INDEPENDENT
GROCERY_SHOPPING: INDEPENDENT

## 2025-08-06 ASSESSMENT — PATIENT HEALTH QUESTIONNAIRE - PHQ9
1. LITTLE INTEREST OR PLEASURE IN DOING THINGS: NOT AT ALL
2. FEELING DOWN, DEPRESSED OR HOPELESS: NOT AT ALL
SUM OF ALL RESPONSES TO PHQ9 QUESTIONS 1 AND 2: 0

## 2025-08-06 NOTE — PROGRESS NOTES
Chief Complaint:   Medicare Wellness Exam/Comprehensive Problem Focused Follow Up and Physical Exam    HPI:  She lives  in [address] by self and her brother lives one floor below.  She has chronic pain , multiple medical issues and has ileostomy.   She uses cane , and able to do ADLs. No recent fall , injury  She has bilateral LE tingling , numbness and feeling of burning over feet soles, more so at night.    She has pain and reduced movement of left arm.  MRI of L S spine reviewed with her , she has multilevel spondylosis and varying degree  of central canal stenosis / neural foraminal narrowing.  Surgery was postponed for L S Spine, per Dr Izaguirre and she had heart cath per Dr Chung.  Leg swelling has resolved now. She will get L S spine surgery later on.  She goes to Pain management and gets Belbuca film and oxycodone , OARRS reviewed  She uses [employer] transportation service , uses cane     PMH:  Immun/Inj. Record:  91301-Covid 19 Moderna Sars-Cov-2vac mRNA, LNP-S, PF, 100 mcg/ 0.5 mL 03/30/21 02/21/21  90732-Pneumovax 23 08/12/21  81283-Hlorpcw High Dose  56077-361-61 11/03/21  Medical Problems:  Anemia, Carpal tunnel syndrome on both sides, Cervical radiculopathy, Hyperlipidemia, Hypertension , Asthma  Lumbar spinal stenosis - s/p lumbar epidural injectons  Peripheral neuropathy, TIA (transient ischemic attack)  Surgical Hx:  Past Surgeries - History of Cholecystectomy    History of Dental Surgery    History of Ear pressure equalization tube insertion    History of Excision Of Facial Bone    History of Excision Of Turbinate    Ileostomy    History of Myringotomy - With Ventilating Tube Insertion    History of Nose surgery    History of Sex Reassignment Surgery    2008, associated with her improvement    History of Tonsillectomy With Adenoidectomy        SH:  Personal Habits:  Cigarette Use: Never Smoked Cigarettes.Alcohol: Denies alcohol use.Drug Use: Denies Drug Use.Daily Caffeine: Consumes on average 1 soda  per day, consumes chocolate frequently.    Review of Systems   Constitutional:  Negative for activity change, and fever.        Up with a walker , assistance She does feel tired , low energy  HENT:  Negative for congestion.    Eyes:  Negative for redness and visual disturbance.   Respiratory:  Negative for cough, shortness of breath and wheezing.    Cardiovascular:  Negative for chest pain, palpitations and leg swelling.   Gastrointestinal:  Negative for abdominal pain, blood in stool and constipation.   Endocrine: Negative.    Genitourinary:  Negative for dysuria, frequency and urgency.        +OAB   Musculoskeletal:  Positive for arthralgias, back pain and gait problem. Negative for joint swelling.   Skin:  Rash under breasts , itching  Allergic/Immunologic: Negative.    Neurological:  Positive for weakness. Negative for dizziness, numbness and headaches.   Hematological:  Negative for adenopathy.   Psychiatric/Behavioral:  Negative for agitation and behavioral problems.         8/6/2025     4:05 PM      BP Location Left arm   Patient Position Sitting   Heart Rate 74   Temp 36.3 °C (97.3 °F)   Temp Source Temporal   Resp 18   SpO2 95 %         Tobacco     Smoking status: Never   Passive exposure: Never   Smokeless status: Never   Reviewed: 8/6/2025          Physical Exam  Constitutional:       Appearance: Normal appearance.   HENT:      Nose: No congestion.      Mouth/Throat:      Mouth: Mucous membranes are moist.      Pharynx: Oropharynx is clear.   Eyes:      Conjunctiva/sclera: Conjunctivae normal.   Cardiovascular:      Rate and Rhythm: Normal rate and regular rhythm.      Pulses: Normal pulses.      Heart sounds: Normal heart sounds. No murmur heard.  Pulmonary:      Effort: Pulmonary effort is normal.      Breath sounds: Normal breath sounds. No wheezing or rales.      Dorsal kyphosis  Abdominal:      General: Abdomen is flat. Bowel sounds are normal.      Palpations: Abdomen is soft.      Hernia: No hernia  is present.      Comments: Ileostomy site is ok   Musculoskeletal:         General: No swelling or tenderness. Normal range of motion.      Cervical back: Normal range of motion and neck supple.      Right lower leg: No edema.      Left lower leg: No edema.   Skin:     General: Skin is warm and dry.      Capillary Refill: Capillary refill takes less than 2 seconds.      Findings: Pinkish fungal rash under left breast     Comments: Lumbar surgical scar well healed   Neurological:      General: No focal deficit present.      Mental Status: She is alert and oriented to person, place, and time.   Psychiatric:         Mood and Affect: Mood normal.         Behavior: Behavior normal.         Assessment/Plan     Asthma :  Breo , albuterol inhalers     Lumbar spine DJD:  S/p surgery per Dr Izaguirre   Postop rehab finished at [facility]  Continue OT and PT , pain control  Belbuca film and Oxycodone   She goes to pain management   Up with assist, walker    OAB:  Followup with Dr Acevedo at [facility]    Fungal rash :  Nizoral cream for rash under breast  Fluconazole did not help  Hygienic precautions    Hypertension:  Amlodipine 5mg daily  Carvediol 25mg bid  BILL diet  She follows with Dr Chung  Seen by CNP on 3/11    Hyperlipidemia:  Statin    H/o TIA :  ASA and statin    Ileostomy status:  GI / surgery followup  No leak    She saw VARSHA Cole  at [facility] , had motility studies ( [facility] )  EGD and Flex sig were done   She had UGI and SBFT also per Dr Camara  Low residue diet  Bentyl prn for cramps , and zofran prn for nausea             Vision care  She would wait to get flushot, Covid booster  Mammogram yearly   Check labs

## 2025-08-07 ENCOUNTER — APPOINTMENT (OUTPATIENT)
Dept: PRIMARY CARE | Facility: CLINIC | Age: 70
End: 2025-08-07
Payer: COMMERCIAL

## 2025-08-13 DIAGNOSIS — B34.9 VIRAL ILLNESS: ICD-10-CM

## 2025-08-13 DIAGNOSIS — K29.60 REFLUX GASTRITIS: ICD-10-CM

## 2025-08-13 RX ORDER — OMEPRAZOLE 20 MG/1
CAPSULE, DELAYED RELEASE ORAL
Qty: 30 CAPSULE | Refills: 0 | Status: SHIPPED | OUTPATIENT
Start: 2025-08-13

## 2025-08-13 RX ORDER — AZELASTINE 1 MG/ML
SPRAY, METERED NASAL
Qty: 30 ML | Refills: 0 | Status: SHIPPED | OUTPATIENT
Start: 2025-08-13

## 2025-08-21 LAB
ALBUMIN SERPL-MCNC: 4 G/DL (ref 3.6–5.1)
ALP SERPL-CCNC: 151 U/L (ref 37–153)
ALT SERPL-CCNC: 15 U/L (ref 6–29)
ANION GAP SERPL CALCULATED.4IONS-SCNC: 7 MMOL/L (CALC) (ref 7–17)
APPEARANCE UR: CLEAR
AST SERPL-CCNC: 16 U/L (ref 10–35)
BACTERIA #/AREA URNS HPF: ABNORMAL /HPF
BASOPHILS # BLD AUTO: 32 CELLS/UL (ref 0–200)
BASOPHILS NFR BLD AUTO: 0.7 %
BILIRUB SERPL-MCNC: 0.7 MG/DL (ref 0.2–1.2)
BILIRUB UR QL STRIP: ABNORMAL
BUN SERPL-MCNC: 17 MG/DL (ref 7–25)
CALCIUM SERPL-MCNC: 9 MG/DL (ref 8.6–10.4)
CHLORIDE SERPL-SCNC: 107 MMOL/L (ref 98–110)
CHOLEST SERPL-MCNC: 97 MG/DL
CHOLEST/HDLC SERPL: 2.7 (CALC)
CO2 SERPL-SCNC: 26 MMOL/L (ref 20–32)
COLOR UR: ABNORMAL
CREAT SERPL-MCNC: 0.94 MG/DL (ref 0.6–1)
EGFRCR SERPLBLD CKD-EPI 2021: 65 ML/MIN/1.73M2
EOSINOPHIL # BLD AUTO: 149 CELLS/UL (ref 15–500)
EOSINOPHIL NFR BLD AUTO: 3.3 %
ERYTHROCYTE [DISTWIDTH] IN BLOOD BY AUTOMATED COUNT: 13.5 % (ref 11–15)
GLUCOSE SERPL-MCNC: 108 MG/DL (ref 65–99)
GLUCOSE UR QL STRIP: NEGATIVE
HCT VFR BLD AUTO: 41.7 % (ref 35–45)
HDLC SERPL-MCNC: 36 MG/DL
HGB BLD-MCNC: 13.8 G/DL (ref 11.7–15.5)
HGB UR QL STRIP: ABNORMAL
HYALINE CASTS #/AREA URNS LPF: ABNORMAL /LPF
KETONES UR QL STRIP: ABNORMAL
LDLC SERPL CALC-MCNC: 40 MG/DL (CALC)
LEUKOCYTE ESTERASE UR QL STRIP: ABNORMAL
LYMPHOCYTES # BLD AUTO: 774 CELLS/UL (ref 850–3900)
LYMPHOCYTES NFR BLD AUTO: 17.2 %
MCH RBC QN AUTO: 30.1 PG (ref 27–33)
MCHC RBC AUTO-ENTMCNC: 33.1 G/DL (ref 32–36)
MCV RBC AUTO: 91 FL (ref 80–100)
MONOCYTES # BLD AUTO: 419 CELLS/UL (ref 200–950)
MONOCYTES NFR BLD AUTO: 9.3 %
NEUTROPHILS # BLD AUTO: 3128 CELLS/UL (ref 1500–7800)
NEUTROPHILS NFR BLD AUTO: 69.5 %
NITRITE UR QL STRIP: NEGATIVE
NONHDLC SERPL-MCNC: 61 MG/DL (CALC)
PH UR STRIP: 6 [PH] (ref 5–8)
PLATELET # BLD AUTO: 247 THOUSAND/UL (ref 140–400)
PMV BLD REES-ECKER: 9.1 FL (ref 7.5–12.5)
POTASSIUM SERPL-SCNC: 4.3 MMOL/L (ref 3.5–5.3)
PROT SERPL-MCNC: 6.6 G/DL (ref 6.1–8.1)
PROT UR QL STRIP: ABNORMAL
RBC # BLD AUTO: 4.58 MILLION/UL (ref 3.8–5.1)
RBC #/AREA URNS HPF: ABNORMAL /HPF
SERVICE CMNT-IMP: ABNORMAL
SODIUM SERPL-SCNC: 140 MMOL/L (ref 135–146)
SP GR UR STRIP: 1.02 (ref 1–1.03)
SQUAMOUS #/AREA URNS HPF: ABNORMAL /HPF
TRIGL SERPL-MCNC: 133 MG/DL
TSH SERPL-ACNC: 3.38 MIU/L (ref 0.4–4.5)
WBC # BLD AUTO: 4.5 THOUSAND/UL (ref 3.8–10.8)
WBC #/AREA URNS HPF: ABNORMAL /HPF

## 2025-08-25 ENCOUNTER — PATIENT OUTREACH (OUTPATIENT)
Dept: PRIMARY CARE | Facility: CLINIC | Age: 70
End: 2025-08-25
Payer: COMMERCIAL

## 2025-09-02 ENCOUNTER — APPOINTMENT (OUTPATIENT)
Dept: PRIMARY CARE | Facility: CLINIC | Age: 70
End: 2025-09-02
Payer: COMMERCIAL

## 2025-09-02 ASSESSMENT — PATIENT HEALTH QUESTIONNAIRE - PHQ9
2. FEELING DOWN, DEPRESSED OR HOPELESS: NOT AT ALL
1. LITTLE INTEREST OR PLEASURE IN DOING THINGS: NOT AT ALL
SUM OF ALL RESPONSES TO PHQ9 QUESTIONS 1 AND 2: 0